# Patient Record
Sex: FEMALE | Race: OTHER | Employment: FULL TIME | URBAN - METROPOLITAN AREA
[De-identification: names, ages, dates, MRNs, and addresses within clinical notes are randomized per-mention and may not be internally consistent; named-entity substitution may affect disease eponyms.]

---

## 2019-11-18 ENCOUNTER — TELEPHONE (OUTPATIENT)
Dept: BARIATRICS/WEIGHT MGMT | Age: 39
End: 2019-11-18

## 2019-12-19 ENCOUNTER — NURSE ONLY (OUTPATIENT)
Dept: PRIMARY CARE CLINIC | Age: 39
End: 2019-12-19
Payer: COMMERCIAL

## 2019-12-19 DIAGNOSIS — Z23 NEED FOR HEPATITIS B VACCINATION: Primary | ICD-10-CM

## 2019-12-19 PROCEDURE — 90746 HEPB VACCINE 3 DOSE ADULT IM: CPT | Performed by: NURSE PRACTITIONER

## 2019-12-19 PROCEDURE — 90471 IMMUNIZATION ADMIN: CPT | Performed by: NURSE PRACTITIONER

## 2020-01-07 ENCOUNTER — TELEPHONE (OUTPATIENT)
Dept: BARIATRICS/WEIGHT MGMT | Age: 40
End: 2020-01-07

## 2020-01-10 ENCOUNTER — OFFICE VISIT (OUTPATIENT)
Dept: PSYCHOLOGY | Age: 40
End: 2020-01-10
Payer: COMMERCIAL

## 2020-01-10 PROCEDURE — 90832 PSYTX W PT 30 MINUTES: CPT | Performed by: PSYCHOLOGIST

## 2020-01-10 ASSESSMENT — PATIENT HEALTH QUESTIONNAIRE - PHQ9
4. FEELING TIRED OR HAVING LITTLE ENERGY: 3
SUM OF ALL RESPONSES TO PHQ QUESTIONS 1-9: 19
9. THOUGHTS THAT YOU WOULD BE BETTER OFF DEAD, OR OF HURTING YOURSELF: 1
1. LITTLE INTEREST OR PLEASURE IN DOING THINGS: 2
SUM OF ALL RESPONSES TO PHQ QUESTIONS 1-9: 19
8. MOVING OR SPEAKING SO SLOWLY THAT OTHER PEOPLE COULD HAVE NOTICED. OR THE OPPOSITE, BEING SO FIGETY OR RESTLESS THAT YOU HAVE BEEN MOVING AROUND A LOT MORE THAN USUAL: 3
SUM OF ALL RESPONSES TO PHQ9 QUESTIONS 1 & 2: 3
6. FEELING BAD ABOUT YOURSELF - OR THAT YOU ARE A FAILURE OR HAVE LET YOURSELF OR YOUR FAMILY DOWN: 2
1. LITTLE INTEREST OR PLEASURE IN DOING THINGS: 2
8. MOVING OR SPEAKING SO SLOWLY THAT OTHER PEOPLE COULD HAVE NOTICED. OR THE OPPOSITE, BEING SO FIGETY OR RESTLESS THAT YOU HAVE BEEN MOVING AROUND A LOT MORE THAN USUAL: 3
2. FEELING DOWN, DEPRESSED OR HOPELESS: 1
2. FEELING DOWN, DEPRESSED OR HOPELESS: 1
9. THOUGHTS THAT YOU WOULD BE BETTER OFF DEAD, OR OF HURTING YOURSELF: 1
5. POOR APPETITE OR OVEREATING: 2
7. TROUBLE CONCENTRATING ON THINGS, SUCH AS READING THE NEWSPAPER OR WATCHING TELEVISION: 3
7. TROUBLE CONCENTRATING ON THINGS, SUCH AS READING THE NEWSPAPER OR WATCHING TELEVISION: 3
SUM OF ALL RESPONSES TO PHQ9 QUESTIONS 1 & 2: 3
3. TROUBLE FALLING OR STAYING ASLEEP: 2
5. POOR APPETITE OR OVEREATING: 2
SUM OF ALL RESPONSES TO PHQ QUESTIONS 1-9: 19
3. TROUBLE FALLING OR STAYING ASLEEP: 2
6. FEELING BAD ABOUT YOURSELF - OR THAT YOU ARE A FAILURE OR HAVE LET YOURSELF OR YOUR FAMILY DOWN: 2
SUM OF ALL RESPONSES TO PHQ QUESTIONS 1-9: 19
4. FEELING TIRED OR HAVING LITTLE ENERGY: 3

## 2020-01-10 ASSESSMENT — ANXIETY QUESTIONNAIRES
6. BECOMING EASILY ANNOYED OR IRRITABLE: 3-NEARLY EVERY DAY
3. WORRYING TOO MUCH ABOUT DIFFERENT THINGS: 3-NEARLY EVERY DAY
4. TROUBLE RELAXING: 2-OVER HALF THE DAYS
GAD7 TOTAL SCORE: 15
2. NOT BEING ABLE TO STOP OR CONTROL WORRYING: 2-OVER HALF THE DAYS
7. FEELING AFRAID AS IF SOMETHING AWFUL MIGHT HAPPEN: 3-NEARLY EVERY DAY
5. BEING SO RESTLESS THAT IT IS HARD TO SIT STILL: 1-SEVERAL DAYS
1. FEELING NERVOUS, ANXIOUS, OR ON EDGE: 1-SEVERAL DAYS

## 2020-01-10 ASSESSMENT — COLUMBIA-SUICIDE SEVERITY RATING SCALE - C-SSRS
2. HAVE YOU ACTUALLY HAD ANY THOUGHTS OF KILLING YOURSELF?: NO
6. HAVE YOU EVER DONE ANYTHING, STARTED TO DO ANYTHING, OR PREPARED TO DO ANYTHING TO END YOUR LIFE?: NO
1. WITHIN THE PAST MONTH, HAVE YOU WISHED YOU WERE DEAD OR WISHED YOU COULD GO TO SLEEP AND NOT WAKE UP?: NO
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
## 2020-01-17 ENCOUNTER — OFFICE VISIT (OUTPATIENT)
Dept: PSYCHOLOGY | Age: 40
End: 2020-01-17
Payer: COMMERCIAL

## 2020-01-17 PROCEDURE — 90832 PSYTX W PT 30 MINUTES: CPT | Performed by: PSYCHOLOGIST

## 2020-01-17 ASSESSMENT — PATIENT HEALTH QUESTIONNAIRE - PHQ9
SUM OF ALL RESPONSES TO PHQ QUESTIONS 1-9: 19
6. FEELING BAD ABOUT YOURSELF - OR THAT YOU ARE A FAILURE OR HAVE LET YOURSELF OR YOUR FAMILY DOWN: 2
2. FEELING DOWN, DEPRESSED OR HOPELESS: 2
5. POOR APPETITE OR OVEREATING: 3
8. MOVING OR SPEAKING SO SLOWLY THAT OTHER PEOPLE COULD HAVE NOTICED. OR THE OPPOSITE, BEING SO FIGETY OR RESTLESS THAT YOU HAVE BEEN MOVING AROUND A LOT MORE THAN USUAL: 2
SUM OF ALL RESPONSES TO PHQ9 QUESTIONS 1 & 2: 4
1. LITTLE INTEREST OR PLEASURE IN DOING THINGS: 2
7. TROUBLE CONCENTRATING ON THINGS, SUCH AS READING THE NEWSPAPER OR WATCHING TELEVISION: 2
SUM OF ALL RESPONSES TO PHQ QUESTIONS 1-9: 19
9. THOUGHTS THAT YOU WOULD BE BETTER OFF DEAD, OR OF HURTING YOURSELF: 0
4. FEELING TIRED OR HAVING LITTLE ENERGY: 3
3. TROUBLE FALLING OR STAYING ASLEEP: 3

## 2020-01-17 ASSESSMENT — ANXIETY QUESTIONNAIRES
1. FEELING NERVOUS, ANXIOUS, OR ON EDGE: 1-SEVERAL DAYS
5. BEING SO RESTLESS THAT IT IS HARD TO SIT STILL: 1-SEVERAL DAYS
GAD7 TOTAL SCORE: 10
3. WORRYING TOO MUCH ABOUT DIFFERENT THINGS: 2-OVER HALF THE DAYS
6. BECOMING EASILY ANNOYED OR IRRITABLE: 3-NEARLY EVERY DAY
4. TROUBLE RELAXING: 1-SEVERAL DAYS
7. FEELING AFRAID AS IF SOMETHING AWFUL MIGHT HAPPEN: 1-SEVERAL DAYS
2. NOT BEING ABLE TO STOP OR CONTROL WORRYING: 1-SEVERAL DAYS

## 2020-01-23 ENCOUNTER — TELEPHONE (OUTPATIENT)
Dept: BARIATRICS/WEIGHT MGMT | Age: 40
End: 2020-01-23

## 2020-01-23 NOTE — TELEPHONE ENCOUNTER
Called as a new pt courtesy call - spoke w patient. Did receive paperwork - told patient to have new pt paperwork completely filled out, insurance card, and id and to arrive at appt time. If they didn't have the paperwork filled out and arrive on time may be rescheduled.  Told to bring possible copay and ff location

## 2020-01-30 ENCOUNTER — OFFICE VISIT (OUTPATIENT)
Dept: BARIATRICS/WEIGHT MGMT | Age: 40
End: 2020-01-30
Payer: COMMERCIAL

## 2020-01-30 VITALS
DIASTOLIC BLOOD PRESSURE: 87 MMHG | BODY MASS INDEX: 51.82 KG/M2 | HEART RATE: 80 BPM | HEIGHT: 62 IN | RESPIRATION RATE: 18 BRPM | WEIGHT: 281.6 LBS | SYSTOLIC BLOOD PRESSURE: 136 MMHG | OXYGEN SATURATION: 99 %

## 2020-01-30 PROBLEM — E66.01 MORBID OBESITY WITH BMI OF 50.0-59.9, ADULT (HCC): Status: ACTIVE | Noted: 2020-01-30

## 2020-01-30 PROBLEM — I10 ESSENTIAL HYPERTENSION: Status: ACTIVE | Noted: 2020-01-30

## 2020-01-30 PROBLEM — K21.9 CHRONIC GERD: Status: ACTIVE | Noted: 2020-01-30

## 2020-01-30 PROBLEM — E11.69 DIABETES MELLITUS TYPE 2 IN OBESE (HCC): Status: ACTIVE | Noted: 2020-01-30

## 2020-01-30 PROBLEM — G47.33 OBSTRUCTIVE SLEEP APNEA: Status: ACTIVE | Noted: 2020-01-30

## 2020-01-30 PROBLEM — E66.9 DIABETES MELLITUS TYPE 2 IN OBESE (HCC): Status: ACTIVE | Noted: 2020-01-30

## 2020-01-30 PROBLEM — Z01.818 PREOPERATIVE CLEARANCE: Status: ACTIVE | Noted: 2020-01-30

## 2020-01-30 PROCEDURE — 99205 OFFICE O/P NEW HI 60 MIN: CPT | Performed by: SURGERY

## 2020-01-30 RX ORDER — SERTRALINE HYDROCHLORIDE 100 MG/1
100 TABLET, FILM COATED ORAL
COMMUNITY
Start: 2019-02-21

## 2020-01-30 RX ORDER — VALACYCLOVIR HYDROCHLORIDE 500 MG/1
500 TABLET, FILM COATED ORAL DAILY
COMMUNITY

## 2020-01-30 RX ORDER — LEVOTHYROXINE SODIUM 175 UG/1
175 TABLET ORAL
COMMUNITY
Start: 2017-05-04

## 2020-01-30 RX ORDER — FEXOFENADINE HCL 180 MG/1
180 TABLET ORAL DAILY
COMMUNITY
Start: 2019-10-15

## 2020-01-30 RX ORDER — PIOGLITAZONEHYDROCHLORIDE 30 MG/1
30 TABLET ORAL DAILY
COMMUNITY
Start: 2019-02-21

## 2020-01-30 RX ORDER — ATORVASTATIN CALCIUM 20 MG/1
20 TABLET, FILM COATED ORAL NIGHTLY
COMMUNITY
Start: 2019-12-05

## 2020-01-30 NOTE — PATIENT INSTRUCTIONS
Labs ordered. Psych Evaluation. Cardiac Clearance. Sleep Study & CPAP Compliance. UltraSound Gallbladder. EGD (Upper Endoscopy). Support Group. PCP Letter. Quit Smoking,  Alcohol, Caffeine and Carbonated Drinks  Weight History 2 yrs. F/U in 4 weeks. Patient advised that its their responsibility to follow up for studies, referrals and/or labs ordered today. Patient received dietary handouts and education.     Goals:   -Eat 4-5 times daily  -Avoid high fat and high sugar foods  -Include protein with all meals and snacks  -Avoid carbonation and caffeine  -Avoid calorie containing beverages  -Increase physical activity as tolerated

## 2020-01-30 NOTE — PROGRESS NOTES
Nissa Albrecht is a 44 y.o. female with a date of birth of 1980. Vitals:    01/30/20 1455   BP: 136/87   Pulse: 80   Resp: 18   SpO2: 99%    BMI: Body mass index is 51.51 kg/m². Obesity Classification: Class III    Weight History: Wt Readings from Last 3 Encounters:   01/30/20 281 lb 9.6 oz (127.7 kg)     Patient's lowest adult weight was 160 lbs at age 19's. Patient's highest adult weight was 281 lbs at age 44 - current. Patient has participated in the following weight loss programs: Apture Diet, Legal Shine diet, and physician supervised diet. Patient has participated in meal replacement/liquid diets. Slimfast  Patient has participated in weight loss medications. Patient is lactose intolerant for milk/cheese - yogurt okay in small amounts. Patient does not have Denominational/cultural food concerns. Patient does have food allergies for eggplant + kiwi. Patient does tolerate artificial sweeteners. Patient dines out to a sit down restaurant 10-15 times per month. Patient dines out to a fast food restaurant 10-15 times per month. 24 hour recall/food frequency chart:  Breakfast: yes. Bagel w/ whipped cream cheese OR 1-2 Donuts + Large chocolate coffee  Snack: no. M&M's/candy/cookies  Lunch: yes. Cup'o'Noodles   Snack: no.  Dinner: yes. [employer] Marzette [employer]oor - 2 sandwich + ice cream/dessert  Snack: yes. Leftovers  Drinks throughout the day: 6 cans regular soda, Coffee w/ powdered creamer/sweetener  Do you drink alcohol? Yes. How often/how much alcohol do you drink: 1 Mixed drinks per month. Patient does meet the criteria for binge eating disorder. Patient does have grazing. Patient does not have night eating. Patient does have a history of emotional eating or eating out of boredom. It does take an unusually large amount of food for the patient to feel comfortably full. Most days the patient eats until she is stuffed + sleepy.       Patient describes level of activity as nothing

## 2020-01-30 NOTE — LETTER
[email or web address] [email or web address]  [facility]  Phone: [phone]  Fax: [phone]    Uyen Rausch MD        February 4, 2020     Hugo Grullon MD  No address on file    Patient: Julietta Krabbe  MR Number: <[id number]>  YOB: 1980  Date of Visit: 1/30/2020    Dear Dr. Hugo Grullon:    Thank you for the request for consultation for Julietta Krabbe to me for the evaluation of obesity. Below are the relevant portions of my assessment and plan of care. Julietta Krabbe is a very pleasant 44 y.o. female with Obesity,  Body mass index is 51.51 kg/m². and multiple obesity related co-morbidities. Julietta Krabbe is very motivated to lose weight and being more healthy. We discussed how her weight affects her overall health including:  Patient Active Problem List   Diagnosis    Preoperative clearance    Diabetes mellitus type 2 in obese ([address]    Morbid obesity with BMI of 50.0-59.9, adult ([facility]    Essential hypertension    Chronic GERD    Obstructive sleep apnea      The patient underwent 30 minutes of dietary counseling. I have reviewed, discussed and agree with the dietary plan. Medical weight loss and different surgical options were discussed in details with patient. Clement Pereyra is interested in surgical weight loss for future weight loss. Patient is interested in Laparoscopic Sleeve Gastrectomy, which I believe is an excellent option. We will proceed with pre-operative work up labs and studies. Will also petition patient's  insurance for approval for this procedure. I advised the patient that we can't guarantee final insurance approval.    Patient received dietary handouts and education. Patient advised that its their responsibility to follow up for studies, referrals and/or labs ordered today.    Also discussed in details the importance of follow up, as well following the recommendations and completing the whole program to improve outcomes

## 2020-01-30 NOTE — PROGRESS NOTES
Smoking status: Never Smoker    Smokeless tobacco: Never Used   Substance Use Topics    Alcohol use: Not Currently     I counseled the patient on the importance of not smoking and risks of ETOH. No Known Allergies  Vitals:    01/30/20 1455   BP: 136/87   Pulse: 80   Resp: 18   SpO2: 99%   Weight: 281 lb 9.6 oz (127.7 kg)   Height: 5' 2\" (1.575 m)       Body mass index is 51.51 kg/m². Current Outpatient Medications:     metFORMIN (GLUCOPHAGE) 1000 MG tablet, , Disp: , Rfl:     pioglitazone (ACTOS) 30 MG tablet, Take 30 mg by mouth, Disp: , Rfl:     sertraline (ZOLOFT) 100 MG tablet, Take 100 mg by mouth, Disp: , Rfl:     levothyroxine (SYNTHROID) 175 MCG tablet, Take 175 mcg by mouth, Disp: , Rfl:     atorvastatin (LIPITOR) 20 MG tablet, , Disp: , Rfl:     valACYclovir (VALTREX) 500 MG tablet, Take 500 mg by mouth, Disp: , Rfl:     fexofenadine (ALLEGRA) 180 MG tablet, Take 180 mg by mouth, Disp: , Rfl:       Review of Systems - History obtained from the patient  General ROS: overweight   Psychological ROS: negative  Ophthalmic ROS: negative  Neurological ROS: negative  ENT ROS: negative  Allergy and Immunology ROS: negative  Hematological and Lymphatic ROS: negative  Endocrine ROS: overweight  Breast ROS: negative  Respiratory ROS: negative  Cardiovascular ROS: negative  Gastrointestinal ROS:negative  Genito-Urinary ROS: negative  Musculoskeletal ROS: weight effects on joints  Skin ROS: negative    Physical Exam   Constitutional: Patient is oriented to person, place, and time. Vital signs are normal. Patient  appears well-developed and well-nourished. Patient  is active and cooperative. Non-toxic appearance. No distress. HENT:   Head: Normocephalic and atraumatic. Head is without laceration. Right Ear: External ear normal. No lacerations. No drainage, swelling or tenderness. Left Ear: External ear normal. No lacerations. No drainage, swelling or tenderness.    Nose: Nose normal. No nose lacerations or nasal deformity. Mouth/Throat: Uvula is midline, oropharynx is clear and moist and mucous membranes are normal. No oropharyngeal exudate. Eyes: Conjunctivae, EOM and lids are normal. Pupils are equal, round, and reactive to light. Right eye exhibits no discharge. No foreign body present in the right eye. Left eye exhibits no discharge. No foreign body present in the left eye. No scleral icterus. Neck: Trachea normal and normal range of motion. Neck supple. No JVD present. No tracheal tenderness present. Carotid bruit is not present. No rigidity. No tracheal deviation and no edema present. No thyromegaly present. Cardiovascular: Normal rate, regular rhythm, normal heart sounds, intact distal pulses and normal pulses. Pulmonary/Chest: Effort normal and breath sounds normal. No stridor. No respiratory distress. Patient  has no wheezes. Patient has no rales. Patient exhibits no tenderness and no crepitus. Abdominal: Soft. Normal appearance and bowel sounds are normal. Patient exhibits no distension, no abdominal bruit, no ascites and no mass. There is no hepatosplenomegaly. There is no tenderness. There is no rigidity, no rebound, no guarding and no CVA tenderness. No hernia. Hernia confirmed negative in the ventral area. Musculoskeletal: Normal range of motion. Patient exhibits no edema or tenderness. Lymphadenopathy:        Head (right side): No submental, no submandibular, no preauricular, no posterior auricular and no occipital adenopathy present. Head (left side): No submental, no submandibular, no preauricular, no posterior auricular and no occipital adenopathy present. Patient  has no cervical adenopathy. Right: No supraclavicular adenopathy present. Left: No supraclavicular adenopathy present. Neurological: Patient is alert and oriented to person, place, and time. Patient has normal strength. Coordination and gait normal. GCS eye subscore is 4.  GCS verbal subscore is 5. GCS motor subscore is 6. Skin: Skin is warm and dry. No abrasion and no rash noted. Patient  is not diaphoretic. No cyanosis or erythema. Psychiatric: Patient has a normal mood and affect. speech is normal and behavior is normal. Cognition and memory are normal.         Thai was seen today for bariatric, initial visit. Diagnoses and all orders for this visit:    Diabetes mellitus type 2 in obese ([facility]  -     Protime-INR; Future  -     US Gallbladder Ruq; Future  -     Ambulatory referral to Cardiology  -     Ambulatory referral to Sleep Medicine  -     CBC Auto Differential; Future  -     Comprehensive Metabolic Panel; Future  -     Hemoglobin A1C; Future  -     Iron and TIBC; Future  -     Lipid Panel; Future  -     TSH with Reflex; Future  -     Vitamin B12 & Folate; Future  -     Vitamin A; Future  -     Vitamin B1, Whole Blood; Future  -     Vitamin D 25 Hydroxy; Future  -     Vitamin E; Future    Preoperative clearance  -     Protime-INR; Future  -     US Gallbladder Ruq; Future  -     Ambulatory referral to Cardiology  -     Ambulatory referral to Sleep Medicine  -     CBC Auto Differential; Future  -     Comprehensive Metabolic Panel; Future  -     Hemoglobin A1C; Future  -     Iron and TIBC; Future  -     Lipid Panel; Future  -     TSH with Reflex; Future  -     Vitamin B12 & Folate; Future  -     Vitamin A; Future  -     Vitamin B1, Whole Blood; Future  -     Vitamin D 25 Hydroxy; Future  -     Vitamin E; Future    Morbid obesity with BMI of 50.0-59.9, adult ([facility]  -     Protime-INR; Future  -     US Gallbladder Ruq; Future  -     Ambulatory referral to Cardiology  -     Ambulatory referral to Sleep Medicine  -     CBC Auto Differential; Future  -     Comprehensive Metabolic Panel; Future  -     Hemoglobin A1C; Future  -     Iron and TIBC; Future  -     Lipid Panel; Future  -     TSH with Reflex; Future  -     Vitamin B12 & Folate;  Future  -     Vitamin A; Future  -     Vitamin B1, Whole Blood; Future  -     Vitamin D 25 Hydroxy; Future  -     Vitamin E; Future    Essential hypertension  -     Protime-INR; Future  -     US Gallbladder Ruq; Future  -     Ambulatory referral to Cardiology  -     Ambulatory referral to Sleep Medicine  -     CBC Auto Differential; Future  -     Comprehensive Metabolic Panel; Future  -     Hemoglobin A1C; Future  -     Iron and TIBC; Future  -     Lipid Panel; Future  -     TSH with Reflex; Future  -     Vitamin B12 & Folate; Future  -     Vitamin A; Future  -     Vitamin B1, Whole Blood; Future  -     Vitamin D 25 Hydroxy; Future  -     Vitamin E; Future    Chronic GERD  -     Protime-INR; Future  -     US Gallbladder Ruq; Future  -     Ambulatory referral to Cardiology  -     Ambulatory referral to Sleep Medicine  -     CBC Auto Differential; Future  -     Comprehensive Metabolic Panel; Future  -     Hemoglobin A1C; Future  -     Iron and TIBC; Future  -     Lipid Panel; Future  -     TSH with Reflex; Future  -     Vitamin B12 & Folate; Future  -     Vitamin A; Future  -     Vitamin B1, Whole Blood; Future  -     Vitamin D 25 Hydroxy; Future  -     Vitamin E; Future    Obstructive sleep apnea  -     Protime-INR; Future  -     US Gallbladder Ruq; Future  -     Ambulatory referral to Cardiology  -     Ambulatory referral to Sleep Medicine  -     CBC Auto Differential; Future  -     Comprehensive Metabolic Panel; Future  -     Hemoglobin A1C; Future  -     Iron and TIBC; Future  -     Lipid Panel; Future  -     TSH with Reflex; Future  -     Vitamin B12 & Folate; Future  -     Vitamin A; Future  -     Vitamin B1, Whole Blood; Future  -     Vitamin D 25 Hydroxy; Future  -     Vitamin E; Future          A/P  Pamela Woodard is a very pleasant 44 y.o. female with Obesity,  Body mass index is 51.51 kg/m². and multiple obesity related co-morbidities. Pamela Woodard is very motivated to lose weight and being more healthy.    We discussed how her weight affects her overall health including:  Patient Active Problem List   Diagnosis    Preoperative clearance    Diabetes mellitus type 2 in obese ([facility]    Morbid obesity with BMI of 50.0-59.9, adult ([facility]    Essential hypertension    Chronic GERD    Obstructive sleep apnea      The patient underwent 30 minutes of dietary counseling. I have reviewed, discussed and agree with the dietary plan. Medical weight loss and different surgical options were discussed in details with patient. Ishan Shrestha is interested in surgical weight loss for future weight loss. Patient is interested in Laparoscopic Sleeve Gastrectomy, which I believe is an excellent option. We will proceed with pre-operative work up labs and studies. Will also petition patient's  insurance for approval for this procedure. I advised the patient that we can't guarantee final insurance approval.    Patient received dietary handouts and education. Patient advised that its their responsibility to follow up for studies, referrals and/or labs ordered today. Also discussed in details the importance of follow up, as well following the recommendations and completing the whole program to improve outcomes when it comes to healthier lifestyle as well weight loss. Patient also advised about risks and benefits being on a strict dietary regimen as well using supplements. Patient agrees and wants to proceed with weight loss planning       Patient Instructions   Labs ordered. Psych Evaluation. Cardiac Clearance. Sleep Study & CPAP Compliance. UltraSound Gallbladder. EGD (Upper Endoscopy). Support Group. PCP Letter. Quit Smoking,  Alcohol, Caffeine and Carbonated Drinks  Weight History 2 yrs. F/U in 4 weeks. Patient advised that its their responsibility to follow up for studies, referrals and/or labs ordered today. Patient received dietary handouts and education.     Goals:   -Eat 4-5 times daily  -Avoid high fat and high sugar foods  -Include protein with all meals and snacks  -Avoid carbonation and caffeine  -Avoid calorie containing beverages  -Increase physical activity as tolerated

## 2020-01-31 DIAGNOSIS — E66.01 MORBID OBESITY WITH BMI OF 50.0-59.9, ADULT (HCC): ICD-10-CM

## 2020-01-31 DIAGNOSIS — K21.9 CHRONIC GERD: ICD-10-CM

## 2020-01-31 DIAGNOSIS — E11.69 DIABETES MELLITUS TYPE 2 IN OBESE (HCC): ICD-10-CM

## 2020-01-31 DIAGNOSIS — E66.9 DIABETES MELLITUS TYPE 2 IN OBESE (HCC): ICD-10-CM

## 2020-01-31 DIAGNOSIS — Z01.818 PREOPERATIVE CLEARANCE: ICD-10-CM

## 2020-01-31 DIAGNOSIS — G47.33 OBSTRUCTIVE SLEEP APNEA: ICD-10-CM

## 2020-01-31 DIAGNOSIS — I10 ESSENTIAL HYPERTENSION: ICD-10-CM

## 2020-01-31 LAB
A/G RATIO: 1.3 (ref 1.1–2.2)
ALBUMIN SERPL-MCNC: 3.9 G/DL (ref 3.4–5)
ALP BLD-CCNC: 90 U/L (ref 40–129)
ALT SERPL-CCNC: 17 U/L (ref 10–40)
ANION GAP SERPL CALCULATED.3IONS-SCNC: 12 MMOL/L (ref 3–16)
AST SERPL-CCNC: 16 U/L (ref 15–37)
BASOPHILS ABSOLUTE: 0 K/UL (ref 0–0.2)
BASOPHILS RELATIVE PERCENT: 0.7 %
BILIRUB SERPL-MCNC: 0.6 MG/DL (ref 0–1)
BUN BLDV-MCNC: 11 MG/DL (ref 7–20)
CALCIUM SERPL-MCNC: 8.9 MG/DL (ref 8.3–10.6)
CHLORIDE BLD-SCNC: 98 MMOL/L (ref 99–110)
CHOLESTEROL, TOTAL: 148 MG/DL (ref 0–199)
CO2: 27 MMOL/L (ref 21–32)
CREAT SERPL-MCNC: <0.5 MG/DL (ref 0.6–1.1)
EOSINOPHILS ABSOLUTE: 0.2 K/UL (ref 0–0.6)
EOSINOPHILS RELATIVE PERCENT: 3.3 %
FOLATE: 11.16 NG/ML (ref 4.78–24.2)
GFR AFRICAN AMERICAN: >60
GFR NON-AFRICAN AMERICAN: >60
GLOBULIN: 2.9 G/DL
GLUCOSE BLD-MCNC: 135 MG/DL (ref 70–99)
HCT VFR BLD CALC: 38.9 % (ref 36–48)
HDLC SERPL-MCNC: 43 MG/DL (ref 40–60)
HEMOGLOBIN: 13.1 G/DL (ref 12–16)
INR BLD: 1.01 (ref 0.86–1.14)
IRON SATURATION: 32 % (ref 15–50)
IRON: 102 UG/DL (ref 37–145)
LDL CHOLESTEROL CALCULATED: 79 MG/DL
LYMPHOCYTES ABSOLUTE: 1.6 K/UL (ref 1–5.1)
LYMPHOCYTES RELATIVE PERCENT: 25.8 %
MCH RBC QN AUTO: 30.5 PG (ref 26–34)
MCHC RBC AUTO-ENTMCNC: 33.7 G/DL (ref 31–36)
MCV RBC AUTO: 90.5 FL (ref 80–100)
MONOCYTES ABSOLUTE: 0.4 K/UL (ref 0–1.3)
MONOCYTES RELATIVE PERCENT: 5.6 %
NEUTROPHILS ABSOLUTE: 4.1 K/UL (ref 1.7–7.7)
NEUTROPHILS RELATIVE PERCENT: 64.6 %
PDW BLD-RTO: 13.7 % (ref 12.4–15.4)
PLATELET # BLD: 305 K/UL (ref 135–450)
PMV BLD AUTO: 8.4 FL (ref 5–10.5)
POTASSIUM SERPL-SCNC: 4 MMOL/L (ref 3.5–5.1)
PROTHROMBIN TIME: 11.7 SEC (ref 10–13.2)
RBC # BLD: 4.3 M/UL (ref 4–5.2)
SODIUM BLD-SCNC: 137 MMOL/L (ref 136–145)
T4 FREE: 0.8 NG/DL (ref 0.9–1.8)
TOTAL IRON BINDING CAPACITY: 319 UG/DL (ref 260–445)
TOTAL PROTEIN: 6.8 G/DL (ref 6.4–8.2)
TRIGL SERPL-MCNC: 130 MG/DL (ref 0–150)
TSH REFLEX: 17.12 UIU/ML (ref 0.27–4.2)
VITAMIN B-12: 727 PG/ML (ref 211–911)
VITAMIN D 25-HYDROXY: 50.2 NG/ML
VLDLC SERPL CALC-MCNC: 26 MG/DL
WBC # BLD: 6.3 K/UL (ref 4–11)

## 2020-02-01 LAB
ESTIMATED AVERAGE GLUCOSE: 162.8 MG/DL
HBA1C MFR BLD: 7.3 %

## 2020-02-04 ENCOUNTER — TELEPHONE (OUTPATIENT)
Dept: BARIATRICS/WEIGHT MGMT | Age: 40
End: 2020-02-04

## 2020-02-04 LAB
ALPHA-TOCOPHEROL: 7.5 MG/L (ref 5.5–18)
GAMMA-TOCOPHEROL: 1.9 MG/L (ref 0–6)
RETINYL PALMITATE: <0.02 MG/L (ref 0–0.1)
VITAMIN A LEVEL: 0.37 MG/L (ref 0.3–1.2)
VITAMIN A, INTERP: NORMAL

## 2020-02-05 LAB — VITAMIN B1 WHOLE BLOOD: 167 NMOL/L (ref 70–180)

## 2020-02-20 PROBLEM — E78.2 MIXED HYPERLIPIDEMIA: Status: ACTIVE | Noted: 2020-02-20

## 2020-02-29 PROBLEM — Z01.818 PREOPERATIVE CLEARANCE: Status: RESOLVED | Noted: 2020-01-30 | Resolved: 2020-02-29

## 2020-03-18 ENCOUNTER — TELEPHONE (OUTPATIENT)
Dept: BARIATRICS/WEIGHT MGMT | Age: 40
End: 2020-03-18

## 2020-03-20 ASSESSMENT — ENCOUNTER SYMPTOMS
EYES NEGATIVE: 1
ALLERGIC/IMMUNOLOGIC NEGATIVE: 1
RESPIRATORY NEGATIVE: 1
GASTROINTESTINAL NEGATIVE: 1

## 2020-03-20 NOTE — PATIENT INSTRUCTIONS
Patient received dietary handouts and education. Goals in preparing for bariatric surgery  You should be giving up all beverages that have carbonation, sugar, and caffeine (Refer to the approved liquids list provided at initial visit).  You should be drinking 64 ounces of low calorie (5 calories or less per serving) fluids per day. Suggestions include:  o Water (you may add fresh lemon or lime)  o Crystal Light  o Eliseo Liquid Water Enhancer  o Propel Zero  o Powerade Zero/Gatorade Zero  o Isopure  o Msyer2P  o SOBE Lifewater Zero  o Vitamin Water Zero  o Sugar Free Charles-Aid  You should be eating 4-6 times per day.  Three small meals plus 1-2 snacks per day is your goal. This balances your calories and nutrients evenly throughout the day and helps to boost your metabolism. Refer to the snack list provided at your initial visit. Aim for a protein at every snack, plus a fruit, vegetable or starch. You should be eating protein at every meal and snack.  Protein is typically found in animal sources, i.e. chicken, lean beef, lean pork, fish, seafood and eggs. It is also found in low-fat dairy sources such as skim or 1% milk, low-fat yogurt, low-fat cheese, and low-fat cottage cheese. Plant based sources of protein include peanut butter, beans, and soy. You should be utilizing the 9-inch plate method.  Eating on a smaller plate will help you control portion size, but what you put on your plate counts also. Make ¼ of your plate lean protein, ¼ carbohydrate (fruit, grain or starchy vegetables) and ½ the plate non-starchy vegetables. You should eliminate caffeine.  Caffeine is dehydrating. After surgery, it's very important to stay hydrated. Giving up caffeine before surgery will help you focus on the changes necessary to be successful after surgery. There are many decaffeinated coffee and tea products available in grocery stores. You should be reducing added fat and sugar in your diet.    Frying foods adds

## 2020-03-24 ENCOUNTER — TELEPHONE (OUTPATIENT)
Dept: BARIATRICS/WEIGHT MGMT | Age: 40
End: 2020-03-24

## 2020-03-30 ENCOUNTER — TELEMEDICINE (OUTPATIENT)
Dept: BARIATRICS/WEIGHT MGMT | Age: 40
End: 2020-03-30
Payer: COMMERCIAL

## 2020-03-30 VITALS — WEIGHT: 285 LBS | BODY MASS INDEX: 52.44 KG/M2 | HEIGHT: 62 IN

## 2020-03-30 PROCEDURE — 99442 PR PHYS/QHP TELEPHONE EVALUATION 11-20 MIN: CPT | Performed by: NURSE PRACTITIONER

## 2020-03-30 ASSESSMENT — ENCOUNTER SYMPTOMS
COUGH: 0
SHORTNESS OF BREATH: 0

## 2020-03-30 NOTE — PROGRESS NOTES
Toney Hunt gained 3.1 lbs over past 2 months. Due to the COVID-19 restrictions on close contact interactions the patient's visit was conducted via telephone in man of a face to face visit. The patient is here through telemedicine for their 2nd pre surg visit. Breakfast: granola bar     Snack: granola bar     Lunch: Salad with chicken     Snack: nothing    Dinner: chicken/beef and 1 cup of rice     Snack: grapes OR regular/SF jello     Fluids: caffeinated coffee with powdered creamer and splenda, soda, water, gatorade zero    Is pt consuming smaller portions? Needs to decrease CHO     Is pt consuming at least 64 oz of fluids per day? No    Is pt consuming carbonated, caffeinated, or sugary beverages? Soda, coffee    Has pt sampled Unjury and/or Nectar protein?  Not yet, discussed today     Exercise: Walking     Plan/Recommendations:   Switch granola bar to protein bar - focus on protein for meals/snacks   Switch to decaf coffee/eliminate soda   Decrease CHO portion   Continue walking     Handouts: none    Sean Duke

## 2020-04-16 ASSESSMENT — ENCOUNTER SYMPTOMS
ALLERGIC/IMMUNOLOGIC NEGATIVE: 1
GASTROINTESTINAL NEGATIVE: 1
RESPIRATORY NEGATIVE: 1
EYES NEGATIVE: 1

## 2020-04-16 NOTE — PATIENT INSTRUCTIONS
fryer as it requires significantly less oil. Baking, broiling, or grilling meats add flavor without unhealthy fats. Using cooking oil spray and spray butter products are also healthy options that will aid in your weight loss. Foods high in added sugars are often also high in calories and low in nutrients. Eating habits after surgery need to be a long-term change. Eating habits are often so ingrained that it can be difficult to change. It is important to practice new eating habits prior to surgery to mentally prepare yourself for the challenge ahead. Also, remember that overall health, age, and genetics make each person's weight loss progress different. Do not compare your progress (pre- or post-operatively), the amount you eat, or your exercise to other patients. In addition, it is the responsibility of the patient to schedule and follow up on labs and tests completed during the pre-surgical period. Results will be reviewed at each visit. **IT IS IMPORTANT TO KNOW THAT YOU MUST COMPLETE ALL REQUIRED DIETARY CHANGES, TESTS, CLEARANCES AND ACTIVITIES BEFORE A SURGERY DATE CAN BE GIVEN. IF YOU HAVE NOT MET ANY OF THESE REQUIREMENTS THEN YOU WILL NOT BE GIVEN A SURGERY DATE UNTIL THEY HAVE BEEN MET TO OUR SATISFACTION.  THIS IS NOT ONLY DONE TO HELP YOU BE SUCCESSFUL AFTER SURGERY, BUT TO BE SAFE AS WELL.**

## 2020-04-16 NOTE — PROGRESS NOTES
[facility]) Physicians   [employer] [facility] [employer]    4/27/2020    TELEHEALTH EVALUATION -- Audio/Visual (During FLMTR-16 public health emergency)    Subjective:      Patient ID: Maria Kendall is a 36 y.o. female has requested an audio/video evaluation. HPI    Due to the COVID-19 restrictions on close contact interactions the patient's monthly presurgical visit was conducted via audio/video in man of a face to face visit. Patient has consented to have this visit conducted via audio/video and I am conducting it from the office. The patient is here through telemedicine for their bariatric surgery presurgical visit for future weight loss. She has made several attempts at weight loss in the past without success and now wishes to pursue bariatric surgery. She is working to change her dietary behaviors and lose weight to improve comorbid conditions such as hypertension, diabetes mellitus, hyperlipidemia, obstructive sleep apnea and GERD. Maria Kendall is a 36 y.o. female with Body mass index is 52.13 kg/m². Past Medical History:   Diagnosis Date    Chronic GERD 1/30/2020    Diabetes mellitus type 2 in obese [facility]) 1/30/2020    Essential hypertension 1/30/2020    Morbid obesity with BMI of 50.0-59.9, adult ([facility] 1/30/2020    Obstructive sleep apnea 1/30/2020     No past surgical history on file. Family History   Problem Relation Age of Onset    Hypertension Mother     Stroke Mother     Elevated Lipids Mother     Mental Illness Mother     Hypertension Father     Stroke Father     Elevated Lipids Father     Diabetes Father     Mental Illness Father      Social History     Tobacco Use    Smoking status: Never Smoker    Smokeless tobacco: Never Used   Substance Use Topics    Alcohol use: Not Currently     I counseled the patient on the importance of not smoking and risks of ETOH.    No Known Allergies  Vitals:    04/27/20 1551   Weight: 285 lb (129.3 kg)   Height: 5' 2\" (1.575 m)     Body

## 2020-04-27 ENCOUNTER — TELEMEDICINE (OUTPATIENT)
Dept: BARIATRICS/WEIGHT MGMT | Age: 40
End: 2020-04-27
Payer: COMMERCIAL

## 2020-04-27 VITALS — BODY MASS INDEX: 52.44 KG/M2 | WEIGHT: 285 LBS | HEIGHT: 62 IN

## 2020-04-27 PROCEDURE — 3051F HG A1C>EQUAL 7.0%<8.0%: CPT | Performed by: NURSE PRACTITIONER

## 2020-04-27 PROCEDURE — 99213 OFFICE O/P EST LOW 20 MIN: CPT | Performed by: NURSE PRACTITIONER

## 2020-04-27 ASSESSMENT — ENCOUNTER SYMPTOMS
SHORTNESS OF BREATH: 0
COUGH: 0

## 2020-04-27 NOTE — PROGRESS NOTES
Radha Dodge wt is unchanged, per pt report.     Due to the COVID-19 restrictions on close contact interactions the patient's visit was conducted via telephone in man of a face to face visit. The patient is here through telemedicine for their 3nd pre surg visit. *Attempted to call pt x2 to complete the nutrition portion of their visit. Pt did not answer, VM left. Support group information was emailed to [email or web address] per provider request.

## 2020-04-29 ENCOUNTER — TELEPHONE (OUTPATIENT)
Dept: BARIATRICS/WEIGHT MGMT | Age: 40
End: 2020-04-29

## 2020-05-12 ENCOUNTER — TELEPHONE (OUTPATIENT)
Dept: BARIATRICS/WEIGHT MGMT | Age: 40
End: 2020-05-12

## 2020-09-08 ENCOUNTER — HOSPITAL ENCOUNTER (OUTPATIENT)
Dept: MAMMOGRAPHY | Age: 40
Discharge: HOME | End: 2020-09-12
Payer: COMMERCIAL

## 2020-09-08 PROCEDURE — 77063 BREAST TOMOSYNTHESIS BI: CPT

## 2021-02-24 LAB
HPV COMMENT: NORMAL
HPV TYPE 16: NOT DETECTED
HPV TYPE 18: NOT DETECTED
HPVOH (OTHER TYPES): NOT DETECTED

## 2021-09-10 ENCOUNTER — HOSPITAL ENCOUNTER (OUTPATIENT)
Dept: MAMMOGRAPHY | Age: 41
Discharge: HOME | End: 2021-09-14
Payer: COMMERCIAL

## 2021-09-10 DIAGNOSIS — Z12.31 VISIT FOR SCREENING MAMMOGRAM: ICD-10-CM

## 2021-10-20 ENCOUNTER — HOSPITAL ENCOUNTER (OUTPATIENT)
Dept: MAMMOGRAPHY | Age: 41
Discharge: HOME | End: 2021-10-24
Payer: COMMERCIAL

## 2021-10-20 VITALS — HEIGHT: 62 IN | BODY MASS INDEX: 47.84 KG/M2 | WEIGHT: 260 LBS

## 2021-10-20 DIAGNOSIS — Z12.31 VISIT FOR SCREENING MAMMOGRAM: ICD-10-CM

## 2021-10-20 PROCEDURE — 77063 BREAST TOMOSYNTHESIS BI: CPT

## 2021-11-08 ENCOUNTER — TELEPHONE (OUTPATIENT)
Dept: BARIATRICS/WEIGHT MGMT | Age: 41
End: 2021-11-08

## 2021-12-02 ENCOUNTER — OFFICE VISIT (OUTPATIENT)
Dept: BARIATRICS/WEIGHT MGMT | Age: 41
End: 2021-12-02
Payer: COMMERCIAL

## 2021-12-02 VITALS
OXYGEN SATURATION: 98 % | DIASTOLIC BLOOD PRESSURE: 79 MMHG | SYSTOLIC BLOOD PRESSURE: 121 MMHG | HEIGHT: 62 IN | RESPIRATION RATE: 18 BRPM | WEIGHT: 284 LBS | BODY MASS INDEX: 52.26 KG/M2 | HEART RATE: 67 BPM

## 2021-12-02 DIAGNOSIS — E66.01 MORBID OBESITY WITH BMI OF 50.0-59.9, ADULT (HCC): Primary | ICD-10-CM

## 2021-12-02 DIAGNOSIS — G47.33 OBSTRUCTIVE SLEEP APNEA: ICD-10-CM

## 2021-12-02 DIAGNOSIS — I10 ESSENTIAL HYPERTENSION: ICD-10-CM

## 2021-12-02 DIAGNOSIS — E78.2 MIXED HYPERLIPIDEMIA: ICD-10-CM

## 2021-12-02 DIAGNOSIS — K21.9 CHRONIC GERD: ICD-10-CM

## 2021-12-02 PROCEDURE — 99215 OFFICE O/P EST HI 40 MIN: CPT | Performed by: SURGERY

## 2021-12-02 RX ORDER — GLIPIZIDE 5 MG/1
5 TABLET ORAL
COMMUNITY

## 2021-12-02 RX ORDER — DEXTROAMPHETAMINE SACCHARATE, AMPHETAMINE ASPARTATE MONOHYDRATE, DEXTROAMPHETAMINE SULFATE AND AMPHETAMINE SULFATE 2.5; 2.5; 2.5; 2.5 MG/1; MG/1; MG/1; MG/1
10 CAPSULE, EXTENDED RELEASE ORAL EVERY MORNING
COMMUNITY

## 2021-12-02 NOTE — PROGRESS NOTES
Terence Painting is a 39 y.o. female with a date of birth of 1980. Vitals:    12/02/21 1022   BP: 121/79   Pulse: 67   Resp: 18   SpO2: 98%    BMI: Body mass index is 51.94 kg/m². Obesity Classification: Class III    Weight History: Wt Readings from Last 3 Encounters:   12/02/21 284 lb (128.8 kg)   10/20/21 260 lb (117.9 kg)   04/27/20 285 lb (129.3 kg)       Patient's lowest adult weight was 160 lbs at age 25. Patient's highest adult weight was 295 lbs at age recently. Patient has participated in the following weight loss programs: Cabbage Soup Diet, zone diet    Patient has participated in meal replacement/liquid diets. Patient has participated in weight loss medications. Patient is  lactose intolerant. Patient does not have Gnosticist/cultural food concerns. Patient does have food allergies- eggplant. Patient does tolerate artificial sweeteners. 24 hour recall/food frequency chart:    Breakfast: yes. 2-3 days per week only. 2 slices toast with butter, OR 2 eggs scrambled or HB  Snack: no.   Lunch: yes. \"Whatever I can find\" - hungryman frozen dinner or leftovers. Ham & cheese sandwich with regular conklin  Snack: no. Rare   Dinner: yes. Cooks, makes homemade corn tortillas, white rice, beans, stewed chix, stewed potatoes. Typical meal 2 corn + 1 cup rice + chix + potatoes. No veggies  Dines out 2-3 nights per week    Snack: yes. Trail mix fruit/nuts    Drinks throughout the day: seltzer water - 3 cans, pepsi - 3 cans, 1 cup coffee with coffeemate powder creamer and splenda    Do you drink alcohol? Yes. Few times per year - after 1 gets sleepy    Patient does meet the criteria for binge eating disorder. Patient does not have grazing. Patient does not have night eating. Patient does have a history of emotional eating or eating out of boredom.     Pt describes sleep routine as: she snores a lot- she thinks she may have sleep apnea, doesn't have a regular sleep schedule    Surgery  Patient does feel confident in her ability to make these changes. The patient's expectations of post-surgical eating habits realistic. Patient states she does understand the consequences of not complying with post-op food guidelines. Patient states she does understands the long term changes in food intake that will be necessary for all occasions after surgery for the rest of her life. Patient is deemed nutritionally appropriate to proceed. Goals  Weight: 160  Health Improvement: DM2, CHF, arthritis    Assessment  Nutritional Needs: RMR=(9.99 x 128.8kg) + (6.25 x 157.5cm) - (4.92 x 41 y.o.) -161= 1910 kcal x 1.3 (sedentary activity factor)= 2483 kcal - 1000 (for 2 lb weight loss/week)= 1483 kcal.    Plan  Plan/Recommendations: Start presurgical guidelines. Goals:   -Eat 4-5 times daily  -Avoid high fat and high sugar foods  -Include protein with all meals and snacks  -Avoid beverages with carbonation, caffeine, and added sugars   -Increase physical activity as tolerated    PES Statement:  Overweight/Obesity related to lack of exercise, sedentary lifestyle, unhealthy eating habits, and unsuccessful diet attempts as evidenced by BMI. Body mass index is 47.55 kg/m². Will follow up as necessary.     Butch Nj, CHAY, LD

## 2021-12-02 NOTE — PATIENT INSTRUCTIONS
Patient received dietary handouts and education.        -Plan for Laparoscopic sleeve gastrectomy      Pre-operative work up Ordered:    - Nutrition Labs. - Psych Evaluation.   - Cardiac Clearance. - Sleep Study & CPAP Compliance. - EGD (endoscopy to check your stomach). - Support Group Attendance. - Obtain letter of medical necessity (PCP Letter). - Quit Smoking,  Alcohol, Caffeine and Carbonated Drinks  - Obtain records for Weight History 2 yrs. - Start Regular Exercise and track your activities. - Start Tracking your food Intake and follow dietary guidelines. - Avoid Pregnancy for 2 yrs from date of surgery. (for female patients in childbearing age)  - F/U in 4 weeks. Patient advised that its their responsibility to follow up for studies, referrals and/or labs ordered today.

## 2021-12-03 NOTE — PROGRESS NOTES
[facility]) Physicians   [facility]  Deandre Bello MD, [address] [facility], [address] [place] [address]    [facility] [id number] . Phone: [phone]  Fax: [phone]       Chief Complaint   Patient presents with    Obesity     NP [facility] restart           HPI:    Maritza Paz is a very pleasant 39 y.o. obese female ,   Body mass index is 51.94 kg/m². And multiple medical problems who is presenting for weight loss surgery evaluation and consultation by Dr. Ana Dangelo. Patient has been struggling for several years now with obesity. Patient feels the weight is an obstacle to achieve and perform things in daily living as well risk on health. Tries to diet, and exercise but can't keep the weight off. Patient tried [employer] Diet, zone diet     Patient has participated in meal replacement/liquid diets. Patient has participated in weight loss medications and other regimens, but with no sustainable weight loss. Patient  is very determined to lose weight and be healthy, and is interested in surgical weight loss for future weight loss. .    Otherwise patient denies any nausea, vomiting, fevers, chills, shortness of breath, chest pain, constipation or urinary symptoms. Obesity related problems Sofia Patel is dealing with:  Patient Active Problem List   Diagnosis    Preoperative clearance    Diabetes mellitus type 2 in obese ([address]    Morbid obesity with BMI of 50.0-59.9, adult ([address]    Essential hypertension    Chronic GERD    Obstructive sleep apnea    Mixed hyperlipidemia           Pain Assessment   Denies any abdominal pain     Past Medical History:   Diagnosis Date    Abdominal hernia     Anxiety     Arthritis     Chronic GERD 1/30/2020    Depression     Diabetes mellitus type 2 in obese ([address] 1/30/2020    Essential hypertension 1/30/2020    Morbid obesity with BMI of 50.0-59.9, adult ([address] 1/30/2020    Obstructive sleep apnea 1/30/2020     History reviewed.  No pertinent surgical history. Family History   Problem Relation Age of Onset    Hypertension Mother     Stroke Mother     Elevated Lipids Mother     Mental Illness Mother     Hypertension Father     Stroke Father     Elevated Lipids Father     Diabetes Father     Mental Illness Father      Social History     Tobacco Use    Smoking status: Never Smoker    Smokeless tobacco: Never Used   Substance Use Topics    Alcohol use: Not Currently         I counseled the patient on the risks of Smoking, ETOH or Drug use, and importance of completely avoiding them, otherwise patient risks surgery cancellation or post operative serious complications if they start using any. Shauna Shoulder acknowledged, agreed not to use and wants to proceed. No Known Allergies  Vitals:    12/02/21 1022   BP: 121/79   Pulse: 67   Resp: 18   SpO2: 98%   Weight: 284 lb (128.8 kg)   Height: 5' 2\" (1.575 m)       Body mass index is 51.94 kg/m².       Current Outpatient Medications:     amphetamine-dextroamphetamine (ADDERALL XR) 10 MG extended release capsule, Take 10 mg by mouth every morning., Disp: , Rfl:     glipiZIDE (GLUCOTROL) 5 MG tablet, Take 5 mg by mouth 2 times daily (before meals), Disp: , Rfl:     metFORMIN (GLUCOPHAGE) 1000 MG tablet, , Disp: , Rfl:     pioglitazone (ACTOS) 30 MG tablet, Take 30 mg by mouth, Disp: , Rfl:     sertraline (ZOLOFT) 100 MG tablet, Take 100 mg by mouth, Disp: , Rfl:     levothyroxine (SYNTHROID) 175 MCG tablet, Take 175 mcg by mouth, Disp: , Rfl:     atorvastatin (LIPITOR) 20 MG tablet, , Disp: , Rfl:     valACYclovir (VALTREX) 500 MG tablet, Take 500 mg by mouth, Disp: , Rfl:     fexofenadine (ALLEGRA) 180 MG tablet, Take 180 mg by mouth, Disp: , Rfl:       Review of Systems - History obtained from the patient  General ROS: overweight   Psychological ROS: negative  Ophthalmic ROS: negative  Neurological ROS: negative  ENT ROS: negative  Allergy and Immunology ROS: negative  Hematological and Lymphatic ROS: negative  Endocrine ROS: overweight  Breast ROS: negative  Respiratory ROS: negative  Cardiovascular ROS: negative  Gastrointestinal ROS:negative  Genito-Urinary ROS: negative  Musculoskeletal ROS: weight effects on joints  Skin ROS: negative    Physical Exam   Constitutional: Patient is oriented to person, place, and time. Vital signs are normal. Patient  appears well-developed and well-nourished. Patient  is active and cooperative. Non-toxic appearance. No distress. HENT:   Head: Normocephalic and atraumatic. Head is without laceration. Right Ear: External ear normal. No lacerations. No drainage, swelling or tenderness. Left Ear: External ear normal. No lacerations. No drainage, swelling or tenderness. Nose/Mouth/Throat: Patient is wearing mask due to Covid-19 pandemic precautions, following CDC and health authorities guidelines. Eyes: Conjunctivae, EOM and lids are normal. Pupils are equal, round, and reactive to light. Right eye exhibits no discharge. No foreign body present in the right eye. Left eye exhibits no discharge. No foreign body present in the left eye. No scleral icterus. Neck: Trachea normal and normal range of motion. Neck supple. No JVD present. No tracheal tenderness present. Carotid bruit is not present. No rigidity. No tracheal deviation and no edema present. No thyromegaly present. Cardiovascular: Normal rate, regular rhythm, normal heart sounds, intact distal pulses and normal pulses. Pulmonary/Chest: Effort normal and breath sounds normal. No stridor. No respiratory distress. Patient  has no wheezes. Patient has no rales. Patient exhibits no tenderness and no crepitus. Abdominal: Soft. Normal appearance and bowel sounds are normal. Patient exhibits no distension, no abdominal bruit, no ascites and no mass. There is no hepatosplenomegaly. There is no tenderness. There is no rigidity, no rebound, no guarding and no CVA tenderness. No hernia. Hernia confirmed negative in the ventral area. Musculoskeletal: Normal range of motion. Patient exhibits no edema or tenderness. Lymphadenopathy:        Head (right side): No submental, no submandibular, no preauricular, no posterior auricular and no occipital adenopathy present. Head (left side): No submental, no submandibular, no preauricular, no posterior auricular and no occipital adenopathy present. Patient  has no cervical adenopathy. Right: No supraclavicular adenopathy present. Left: No supraclavicular adenopathy present. Neurological: Patient is alert and oriented to person, place, and time. Patient has normal strength. Coordination and gait normal. GCS eye subscore is 4. GCS verbal subscore is 5. GCS motor subscore is 6. Skin: Skin is warm and dry. No abrasion and no rash noted. Patient  is not diaphoretic. No cyanosis or erythema. Psychiatric: Patient has a normal mood and affect. speech is normal and behavior is normal. Cognition and memory are normal.         Dilip Bean was seen today for obesity. Diagnoses and all orders for this visit:    Morbid obesity with BMI of 50.0-59.9, adult ([facility]  -     CBC Auto Differential; Future  -     Comprehensive Metabolic Panel; Future  -     Hemoglobin A1C; Future  -     Iron and TIBC; Future  -     Lipid Panel; Future  -     TSH with Reflex; Future  -     Vitamin A; Future  -     Vitamin B1, Whole Blood; Future  -     Vitamin B12 & Folate; Future  -     Vitamin D 25 Hydroxy; Future  -     Vitamin E; Future  -     Protime-INR; Future  -     Ambulatory referral to Cardiology  -     Ambulatory referral to Sleep Medicine    Essential hypertension  -     CBC Auto Differential; Future  -     Comprehensive Metabolic Panel; Future  -     Hemoglobin A1C; Future  -     Iron and TIBC; Future  -     Lipid Panel; Future  -     TSH with Reflex; Future  -     Vitamin A; Future  -     Vitamin B1, Whole Blood;  Future  -     Vitamin B12 & Folate; Future  -     Vitamin D 25 Hydroxy; Future  -     Vitamin E; Future  -     Protime-INR; Future  -     Ambulatory referral to Cardiology  -     Ambulatory referral to Sleep Medicine    Chronic GERD  -     CBC Auto Differential; Future  -     Comprehensive Metabolic Panel; Future  -     Hemoglobin A1C; Future  -     Iron and TIBC; Future  -     Lipid Panel; Future  -     TSH with Reflex; Future  -     Vitamin A; Future  -     Vitamin B1, Whole Blood; Future  -     Vitamin B12 & Folate; Future  -     Vitamin D 25 Hydroxy; Future  -     Vitamin E; Future  -     Protime-INR; Future  -     Ambulatory referral to Cardiology  -     Ambulatory referral to Sleep Medicine    Obstructive sleep apnea  -     CBC Auto Differential; Future  -     Comprehensive Metabolic Panel; Future  -     Hemoglobin A1C; Future  -     Iron and TIBC; Future  -     Lipid Panel; Future  -     TSH with Reflex; Future  -     Vitamin A; Future  -     Vitamin B1, Whole Blood; Future  -     Vitamin B12 & Folate; Future  -     Vitamin D 25 Hydroxy; Future  -     Vitamin E; Future  -     Protime-INR; Future  -     Ambulatory referral to Cardiology  -     Ambulatory referral to Sleep Medicine    Mixed hyperlipidemia  -     CBC Auto Differential; Future  -     Comprehensive Metabolic Panel; Future  -     Hemoglobin A1C; Future  -     Iron and TIBC; Future  -     Lipid Panel; Future  -     TSH with Reflex; Future  -     Vitamin A; Future  -     Vitamin B1, Whole Blood; Future  -     Vitamin B12 & Folate; Future  -     Vitamin D 25 Hydroxy; Future  -     Vitamin E; Future  -     Protime-INR; Future  -     Ambulatory referral to Cardiology  -     Ambulatory referral to Sleep Medicine          A/P  Esthela Norris is a very pleasant 39 y.o. female with Obesity,  Body mass index is 51.94 kg/m². and multiple obesity related co-morbidities. Esthela Norris is very motivated to lose weight and being more healthy.      We discussed how her weight affects her overall health including:  Patient Active Problem List   Diagnosis    Preoperative clearance    Diabetes mellitus type 2 in obese ([facility]    Morbid obesity with BMI of 50.0-59.9, adult ([facility]    Essential hypertension    Chronic GERD    Obstructive sleep apnea    Mixed hyperlipidemia          The patient underwent extensive dietary counseling with the registered dietitian. I have reviewed, discussed and agree with the dietary plan. Medical weight loss and different surgical options were discussed in details with patient. Jono Garcia is interested in surgical weight loss for future weight loss. Patient is interested in Laparoscopic Sleeve Gastrectomy, which I believe is an excellent option. I explained to the patient that surgery does carry a risk specially with the coexisting comorbid conditions the patient have. Surgery as well in obese patiens can carry more risk. Risks including but not limited to; Infection, bleeding, gastric leak or obstruction, persistent nausea, vomiting, or reflux, injury to surrounding structures, risks of anesthesia, stricture, delayed gastric emptying, staple line leak, incisional hernia, malnutrition , hair loss, and/ or Conversion to Open surgery may be necessary. Failure to lose or maintain weight loss, Gallstones or Kidney Stones, Deep Venous Thrombosis , pulmonary embolism and / or death. However I do believe the benefits outweighs that risk. Shauna Shoulder understands the risks and wants to proceed. We will proceed with pre-operative work up labs and studies. Will also petition patient's  insurance for approval for this procedure. I advised the patient that we can't guarantee final insurance approval.    Patient received dietary handouts and education. Patient advised that its their responsibility to follow up for studies, referrals and/or labs ordered today.    Also discussed in details the importance of follow up, as well following the recommendations and completing the whole program to improve outcomes when it comes to healthier lifestyle as well weight loss. Patient also advised about risks and benefits being on a strict dietary regimen as well using supplements. Patient agrees and wants to proceed with weight loss planning     Today's encounter included any number of the following: Bariatric Pre/Post operative work up/protocols, review of labs, imaging, provider notes, outside hospital records, performing examination/evaluation, counseling patient and/or family, ordering medications/tests, placing referrals and communication with referring physicians, coordination of care; discussing dietary plan/recall with the patient as well with registered dietitian and documentation in the EHR. Of note, the above was done during same day of the actual patient encounter. Obesity as a disease is considered a high risk to patients overall health and should therefore be considered a high risk disease state. Advised the patient that not getting there weight under control (weight loss hopefully will help with resolving/improving of the comorbid conditions),  that could increase risk of complications/worsening of those conditions on the long-term. Now with Covid-19 pandemic, CDC and health authorities does classify obese patients as vulnerable and high risk as well. Which makes weight loss a priority for improvement of their wellbeing and overall health. CDC has issued the following statement as far Obese patients being at Increased Risk of being critically ill from SARS-Cov-2  \"Severe obesity increases the risk of a serious breathing problem called acute respiratory distress syndrome (ARDS), which is a major complication of EJDCQ-24 and can cause difficulties with a doctors ability to provide respiratory support for seriously ill patients.  People living with severe obesity can have multiple serious chronic diseases and underlying health conditions that can increase the risk of severe illness from COVID-19. \"       Patient Instructions   Patient received dietary handouts and education.        -Plan for Laparoscopic sleeve gastrectomy      Pre-operative work up Ordered:    - Nutrition Labs. - Psych Evaluation.   - Cardiac Clearance. - Sleep Study & CPAP Compliance. - EGD (endoscopy to check your stomach). - Support Group Attendance. - Obtain letter of medical necessity (PCP Letter). - Quit Smoking,  Alcohol, Caffeine and Carbonated Drinks  - Obtain records for Weight History 2 yrs. - Start Regular Exercise and track your activities. - Start Tracking your food Intake and follow dietary guidelines. - Avoid Pregnancy for 2 yrs from date of surgery. (for female patients in childbearing age)  - F/U in 4 weeks. Patient advised that its their responsibility to follow up for studies, referrals and/or labs ordered today. Please note that some or all of this report was generated using voice recognition software. Please notify me in case of any questions about the content of this document, as some errors in transcription may have occurred .

## 2021-12-10 LAB
ALBUMIN SERPL-MCNC: 3.9 G/DL (ref 3.5–5.7)
ALP BLD-CCNC: 67 IU/L (ref 35–135)
ALT SERPL-CCNC: 18 IU/L (ref 10–60)
ANION GAP SERPL CALCULATED.3IONS-SCNC: 4 MMOL/L (ref 6–18)
AST SERPL-CCNC: 18 IU/L (ref 10–40)
BASOPHILS ABSOLUTE: 0 %
BASOPHILS ABSOLUTE: 0 THOU/MCL (ref 0–0.2)
BILIRUB SERPL-MCNC: 0.5 MG/DL (ref 0–1.2)
BUN BLDV-MCNC: 16 MG/DL (ref 8–26)
CALCIUM SERPL-MCNC: 9 MG/DL (ref 8.5–10.4)
CHLORIDE BLD-SCNC: 103 MEQ/L (ref 98–111)
CHOLESTEROL, TOTAL: 110 MG/DL
CO2: 30 MMOL/L (ref 21–31)
CREAT SERPL-MCNC: 0.55 MG/DL (ref 0.6–1.2)
EGFR (CKD-EPI): 117 ML/MIN/1.73 M2
EOSINOPHILS ABSOLUTE: 0.2 THOU/MCL (ref 0.03–0.45)
EOSINOPHILS RELATIVE PERCENT: 3 %
ESTIMATED AVERAGE GLUCOSE: 194 MG/DL
GLUCOSE BLD-MCNC: 94 MG/DL (ref 70–99)
HBA1C MFR BLD: 8.4 % (ref 4.2–5.6)
HCT VFR BLD CALC: 38 % (ref 36–46)
HDLC SERPL-MCNC: 41 MG/DL
HEMOGLOBIN: 12.6 G/DL (ref 12–15.2)
INR BLD: 1 (ref 0.8–1.2)
IRON SATURATION: 21 % (ref 20–50)
IRON: 70 MCG/DL (ref 30–160)
LDL CHOLESTEROL CALCULATED: 50 MG/DL
LYMPHOCYTES ABSOLUTE: 2.1 THOU/MCL (ref 1–4)
LYMPHOCYTES RELATIVE PERCENT: 25 %
MCH RBC QN AUTO: 29.7 PG (ref 27–33)
MCHC RBC AUTO-ENTMCNC: 33.1 G/DL (ref 32–36)
MCV RBC AUTO: 90 FL (ref 82–97)
MONOCYTES # BLD: 6 %
MONOCYTES ABSOLUTE: 0.5 THOU/MCL (ref 0.2–0.9)
NEUTROPHILS ABSOLUTE: 5.4 THOU/MCL (ref 1.8–7.7)
NONHDLC SERPL-MCNC: 69 MG/DL
PDW BLD-RTO: 14.1 % (ref 12.3–17)
PLATELET # BLD: 295 THOU/MCL (ref 140–375)
PMV BLD AUTO: 8.5 FL (ref 7.4–11.5)
POTASSIUM SERPL-SCNC: 3.9 MEQ/L (ref 3.6–5.1)
PROTHROMBIN TIME: 13.2 SECONDS (ref 11.7–14.2)
RBC # BLD: 4.23 MIL/MCL (ref 3.8–5.2)
SEG NEUTROPHILS: 66 %
SODIUM BLD-SCNC: 137 MEQ/L (ref 135–145)
T4 FREE: 0.91 NG/DL (ref 0.8–1.8)
TOTAL IRON BINDING CAPACITY: 332 MCG/DL (ref 250–400)
TOTAL PROTEIN: 7.1 G/DL (ref 6–8)
TRIGL SERPL-MCNC: 96 MG/DL
TSH ULTRASENSITIVE: 11.3 MCIU/ML (ref 0.27–4.2)
VITAMIN B-12: 516 PG/ML
VITAMIN D 25-HYDROXY: 43.1 NG/ML (ref 30–150)
WBC # BLD: 8.2 THOU/MCL (ref 3.6–10.5)

## 2021-12-15 ENCOUNTER — TELEPHONE (OUTPATIENT)
Dept: BARIATRICS/WEIGHT MGMT | Age: 41
End: 2021-12-15

## 2021-12-16 LAB
ALPHA-TOCOPHEROL: 6.5 MG/L (ref 5.5–18)
BETA-GAMA TOCOPHEROL: 2.7 MG/L (ref 0–6)
COMMENT: NORMAL
RETINOL (VITAMIN A): 0.43 MG/L (ref 0.3–1.2)
RETINYL PALMITATE: <0.02 MG/L (ref 0–0.1)
THIAMINE PYROPHOSPHATE, BLOOD: 149 NMOL/L (ref 70–180)

## 2021-12-30 NOTE — TELEPHONE ENCOUNTER
[employer] message sent. Patient has appt with Dr. Cristela Colón on 1/6/. Please cathy the paper chart for lab review. Thanks!

## 2022-01-01 PROBLEM — Z01.818 PREOPERATIVE CLEARANCE: Status: RESOLVED | Noted: 2020-01-30 | Resolved: 2022-01-01

## 2022-01-06 ENCOUNTER — OFFICE VISIT (OUTPATIENT)
Dept: BARIATRICS/WEIGHT MGMT | Age: 42
End: 2022-01-06
Payer: COMMERCIAL

## 2022-01-06 VITALS
BODY MASS INDEX: 51.89 KG/M2 | WEIGHT: 282 LBS | HEIGHT: 62 IN | HEART RATE: 70 BPM | SYSTOLIC BLOOD PRESSURE: 138 MMHG | DIASTOLIC BLOOD PRESSURE: 83 MMHG | OXYGEN SATURATION: 98 % | RESPIRATION RATE: 18 BRPM

## 2022-01-06 DIAGNOSIS — G47.33 OBSTRUCTIVE SLEEP APNEA: ICD-10-CM

## 2022-01-06 DIAGNOSIS — E66.01 MORBID OBESITY WITH BMI OF 50.0-59.9, ADULT (HCC): Primary | ICD-10-CM

## 2022-01-06 DIAGNOSIS — E78.2 MIXED HYPERLIPIDEMIA: ICD-10-CM

## 2022-01-06 DIAGNOSIS — E66.9 DIABETES MELLITUS TYPE 2 IN OBESE (HCC): ICD-10-CM

## 2022-01-06 DIAGNOSIS — I10 ESSENTIAL HYPERTENSION: ICD-10-CM

## 2022-01-06 DIAGNOSIS — K21.9 CHRONIC GERD: ICD-10-CM

## 2022-01-06 DIAGNOSIS — E11.69 DIABETES MELLITUS TYPE 2 IN OBESE (HCC): ICD-10-CM

## 2022-01-06 PROCEDURE — 99214 OFFICE O/P EST MOD 30 MIN: CPT | Performed by: SURGERY

## 2022-01-06 NOTE — PROGRESS NOTES
Rosa Maria Coe lost 2 lbs over 1 month. Pt has been working to decrease soft drink and sweet intake. Breakfast: WG crackers    Snack: SF jello    Lunch: [employer]    Snack: none    Dinner: [employer] - [employer] or chix nuggets + half large fall + sauces    Snack: sometimes - cranberry & nut trail mix / popcorn     Is pt consuming smaller portions? Trying to be mindful    Is pt consuming at least 64 oz of fluids per day? Yes     Is pt consuming carbonated, caffeinated, or sugary beverages? Yes - coffee with powdered creamer & sweetener / 1-2 soft drinks daily    Has pt sampled Unjury and/or Nectar protein? No, reviewed and encouraged    Has patient attended a support group?  Not scheduled - provided SG schedule    Exercise: walking at work    Plan/Recommendations:   - Eat 4-5x day focusing on protein and produce  - Reduce fast food - try meal planning & prepping using crockpot, sheet pan dinners  - Continue to work to eliminate caffeine / carbonation  - Complete SG    Handouts: Frozen Meals /     Fernanda Velásquez RD, LD

## 2022-01-06 NOTE — PROGRESS NOTES
18   SpO2: 98%   Weight: 282 lb (127.9 kg)   Height: 5' 2\" (1.575 m)       Body mass index is 51.58 kg/m².     Lab Results   Component Value Date    WBC 8.2 12/10/2021    RBC 4.23 12/10/2021    HGB 12.6 12/10/2021    HCT 38.0 12/10/2021    MCV 90.0 12/10/2021    MCH 29.7 12/10/2021    MCHC 33.1 12/10/2021    MPV 8.5 12/10/2021    NEUTOPHILPCT 64.6 01/31/2020    LYMPHOPCT 25 12/10/2021    MONOPCT 6 12/10/2021    MONOPCT 5.6 01/31/2020    EOSRELPCT 3 12/10/2021    BASOPCT 0.7 01/31/2020    NEUTROABS 5.40 12/10/2021    LYMPHSABS 2.10 12/10/2021    MONOSABS 0.50 12/10/2021    EOSABS 0.20 12/10/2021     Lab Results   Component Value Date     01/31/2020    K 4.0 01/31/2020    CL 98 01/31/2020    CO2 27 01/31/2020    ANIONGAP 12 01/31/2020    GLUCOSE 135 01/31/2020    BUN 11 01/31/2020    CREATININE <0.5 01/31/2020    LABGLOM >60 01/31/2020    GFRAA >60 01/31/2020    CALCIUM 8.9 01/31/2020    PROT 6.8 01/31/2020    LABALBU 3.9 01/31/2020    AGRATIO 1.3 01/31/2020    BILITOT 0.6 01/31/2020    ALKPHOS 90 01/31/2020    ALT 17 01/31/2020    AST 16 01/31/2020    GLOB 2.9 01/31/2020     Lab Results   Component Value Date    CHOL 110 12/10/2021    TRIG 96 12/10/2021    HDL 41 12/10/2021    LDLCALC 50 12/10/2021    LABVLDL 26 01/31/2020     Lab Results   Component Value Date    TSHREFLEX 17.12 01/31/2020     Lab Results   Component Value Date    IRON 70 12/10/2021    TIBC 332 12/10/2021    LABIRON 21 12/10/2021     Lab Results   Component Value Date    NRDSIBDV24 516 12/10/2021    FOLATE 11.16 01/31/2020     Lab Results   Component Value Date    VITD25 43.1 12/10/2021     Lab Results   Component Value Date    LABA1C 8.4 12/10/2021     12/10/2021         Current Outpatient Medications:     amphetamine-dextroamphetamine (ADDERALL XR) 10 MG extended release capsule, Take 10 mg by mouth every morning., Disp: , Rfl:     glipiZIDE (GLUCOTROL) 5 MG tablet, Take 5 mg by mouth 2 times daily (before meals), Disp: , Rfl:    metFORMIN (GLUCOPHAGE) 1000 MG tablet, , Disp: , Rfl:     pioglitazone (ACTOS) 30 MG tablet, Take 30 mg by mouth, Disp: , Rfl:     sertraline (ZOLOFT) 100 MG tablet, Take 100 mg by mouth, Disp: , Rfl:     levothyroxine (SYNTHROID) 175 MCG tablet, Take 175 mcg by mouth, Disp: , Rfl:     atorvastatin (LIPITOR) 20 MG tablet, , Disp: , Rfl:     valACYclovir (VALTREX) 500 MG tablet, Take 500 mg by mouth, Disp: , Rfl:     fexofenadine (ALLEGRA) 180 MG tablet, Take 180 mg by mouth, Disp: , Rfl:     Review of Systems - History obtained from the patient  General ROS: negative  Psychological ROS: negative  Ophthalmic ROS: negative  Neurological ROS: negative  ENT ROS: negative  Allergy and Immunology ROS: negative  Hematological and Lymphatic ROS: negative  Endocrine ROS: negative  Breast ROS: negative  Respiratory ROS: negative  Cardiovascular ROS: negative  Gastrointestinal ROS:negative  Genito-Urinary ROS: negative  Musculoskeletal ROS: negative   Skin ROS: negative    Physical Exam   Vitals Reviewed   Constitutional: Patient is oriented to person, place, and time. Patient appears well-developed and well-nourished. Patient is active and cooperative. Non-toxic appearance. No distress. HENT:   Head: Normocephalic and atraumatic. Head is without abrasion and without laceration. Hair is normal.   Right Ear: External ear normal. No lacerations. No drainage, swelling . Left Ear: External ear normal. No lacerations. No drainage, swelling. Nose/Mouth: face mask in place  Eyes: Conjunctivae, EOM and lids are normal. Right eye exhibits no discharge. No foreign body present in the right eye. Left eye exhibits no discharge. No foreign body present in the left eye. No scleral icterus. Neck: Trachea normal and normal range of motion. No JVD present. Pulmonary/Chest: Effort normal. No accessory muscle usage or stridor. No apnea. No respiratory distress. Cardiovascular: Normal rate and no JVD.    Abdominal: Normal appearance. Patient exhibits no distension. Abdomen is soft, obese, non tender. Musculoskeletal: Normal range of motion. Patient exhibits no edema. Neurological: Patient is alert and oriented to person, place, and time. Patient has normal strength. GCS eye subscore is 4. GCS verbal subscore is 5. GCS motor subscore is 6. Skin: Skin is warm and dry. No abrasion and no rash noted. Patient is not diaphoretic. No cyanosis or erythema. Psychiatric: Patient has a normal mood and affect. Speech is normal and behavior is normal. Cognition and memory are normal.       A/P    Flora Manzo is 39 y.o. female, Body mass index is 51.58 kg/m². pre surgery, has lost 2 lbs since last visit. The patient underwent extensive dietary counseling with registered dietician. I have reviewed, discussed and agree with the dietary plan. Patient is trying hard to keep good dietary and behavior modifications. Patient is monitoring portion sizes, food choices and liquid calories. Patient is trying to exercise regularly as much as possible. Obesity as a disease is considered a high risk to patients overall health and should therefore be considered a high risk disease state. Advised the patient that not getting there weight under control, that could increase risk of complications/worsening of those conditions on the long-term. (Goal of weight loss surgery is to alleviate/control some of those co-morbidities)    Now with Covid-19 pandemic, CDC and health authorities does classify obese patients as vulnerable and high risk as well. Which makes weight loss a priority for improvement of their wellbeing and overall health. I encouraged the patient to continue exercise and keeping healthy eating habits. Discussed pre-op labs and work up till now. Also counseled the patient extensively on Surgery.        The visit today, included any number of the following: Bariatric Preoperative work up/protocols, review of labs, imaging, provider notes, outside hospital records, performing examination/evaluation, counseling patient and/or family, ordering medications/tests, placing referrals and communication with referring physicians, coordination of care; discussing dietary plan/recall with the patient as well with registered dietitian and documentation in the EHR. Of note, the above was done during same day of the actual patient encounter. Chuy Gutierrez is here for her 2nd presurgical visit discussed nutrition labs so far. Hemoglobin A1c was slightly up to 8.4 from 7 range. Patient is working with her PCP to adjust.  Patient is working on scheduling the remaining preoperative clearances. We will see the patient next month for continued follow-up. We discussed how her excess weight affects her overall health and importance of weight loss, healthy diet and active lifestyle to alleviate those co morbid conditions, otherwise risk deterioration. Morbid Obesity: Body mass index is 51.58 kg/m². [x] Continue to make dietary and lifestyle modifications. [x] Plan for Future laparoscopic sleeve gastrectomy. [x] Return for follow-up next month. Chronic GERD:   [x] Continue to make dietary and lifestyle modifications. [x] Plan for EGD to evaluate the stomach. Essential Hypertension:  [x] Continue to make dietary and lifestyle modifications. [x] Reviewed the importance of checking blood pressure. [x] Continue to follow up with their PCP for medication management and monitoring. Diabetes Mellitus Type II in Obese:   [x] Continue to make dietary and lifestyle modifications. [x] Reviewed the importance of checking blood sugars. [x] Continue to follow up with their PCP and/or Endocrinologist for medication management and monitoring. Hyperlipidemia:   [x] Continue to make dietary and lifestyle modifications. [x] Continue to follow up with their PCP for medication management and monitoring.     Obstructive Sleep Apnea:   [x] Continue to make dietary and lifestyle modifications. [x] Reviewed the importance of wearing / compliance with CPAP/BIPAP. [x] Continue to follow up with their sleep medicine provider for CPAP/BIPAP management and monitoring. Patient advised that its their responsibility to follow up for studies, referrals and/or labs ordered today.

## 2022-02-04 NOTE — PROGRESS NOTES
Patient x__ reached   _____not reached-preop instructions left on voice mail_____________      [facility]_____[phone]___ [id number]_______ARRIVAL___0630_____      Nothing to eat or drink after midnight the night before and follow a clear liquid diet the day before the EGD. If you do not have the instructions or do not understand them please contact your doctors office. Follow any instructions your doctors office has given you including what medications to take the AM of your procedure and which ones to hold. You may use your inhalers. Follow specific doctors office instructions regarding blood thinners and if they want you to hold and for how long. If you are on a blood thinner and have no instructions please contact the office and ask. Dress comfortably,bring your insurance card,picture ID,and a complete list of medications, including supplements. You must have a responsible adult to stay with you during the procedure,drive you home and stay with you. Spoke to  who took down the instructions, but did have time to review her meds or history. COVID TESTING          _x__ Covid test to be done 3-5 days prior to scheduled surgery -patient aware they are REQUIRED to bring a copy of the negative result DOS-if they receive a positive result to notify their surgeon. If known- indicate where patient is getting covid test done_: unknown_______________________________________________________________________    ___ Rapid - DOS    ___ Other _____________________________________      VISITOR POLICY(subject to change)    There is a one visitor policy at [facility] for all surgeries and endoscopies. Whether the visitor can stay or will be asked to wait in the car will depend on the current policy and if social distancing can be maintained. The policy is subject to change at any time. Please make sure the visitor has a cell phone that is on,charged and able to accept calls, as this may be the way that the staff communicates with them. Pain management is NO VISITOR policyThe patients ride is expected to remain in the car with a cell phone for communication. If the ride is leaving the hospital grounds please make sure they are back in time for pickup. Have the patient inform the staff on arrival what their rides plans are while the patient is in the facility. At the MAIN there is one visitor allowed. Please note that the visitor policy is subject to change.

## 2022-02-11 ENCOUNTER — ANESTHESIA EVENT (OUTPATIENT)
Dept: ENDOSCOPY | Age: 42
End: 2022-02-11
Payer: COMMERCIAL

## 2022-02-11 ENCOUNTER — ANESTHESIA (OUTPATIENT)
Dept: ENDOSCOPY | Age: 42
End: 2022-02-11
Payer: COMMERCIAL

## 2022-02-11 ENCOUNTER — HOSPITAL ENCOUNTER (OUTPATIENT)
Age: 42
Setting detail: OUTPATIENT SURGERY
Discharge: HOME HEALTH | End: 2022-02-11
Attending: SURGERY | Admitting: SURGERY
Payer: COMMERCIAL

## 2022-02-11 VITALS
DIASTOLIC BLOOD PRESSURE: 75 MMHG | OXYGEN SATURATION: 95 % | SYSTOLIC BLOOD PRESSURE: 131 MMHG | RESPIRATION RATE: 14 BRPM

## 2022-02-11 VITALS
HEART RATE: 82 BPM | DIASTOLIC BLOOD PRESSURE: 72 MMHG | TEMPERATURE: 97.1 F | HEIGHT: 62 IN | RESPIRATION RATE: 16 BRPM | OXYGEN SATURATION: 98 % | BODY MASS INDEX: 50.61 KG/M2 | WEIGHT: 275 LBS | SYSTOLIC BLOOD PRESSURE: 122 MMHG

## 2022-02-11 LAB
GLUCOSE BLD-MCNC: 130 MG/DL (ref 70–99)
GLUCOSE BLD-MCNC: 137 MG/DL (ref 70–99)
HCG(URINE) PREGNANCY TEST: NEGATIVE
PERFORMED ON: ABNORMAL
PERFORMED ON: ABNORMAL

## 2022-02-11 PROCEDURE — 3700000000 HC ANESTHESIA ATTENDED CARE: Performed by: SURGERY

## 2022-02-11 PROCEDURE — 2500000003 HC RX 250 WO HCPCS: Performed by: NURSE ANESTHETIST, CERTIFIED REGISTERED

## 2022-02-11 PROCEDURE — 7100000010 HC PHASE II RECOVERY - FIRST 15 MIN: Performed by: SURGERY

## 2022-02-11 PROCEDURE — 88305 TISSUE EXAM BY PATHOLOGIST: CPT

## 2022-02-11 PROCEDURE — 84703 CHORIONIC GONADOTROPIN ASSAY: CPT

## 2022-02-11 PROCEDURE — 3609012400 HC EGD TRANSORAL BIOPSY SINGLE/MULTIPLE: Performed by: SURGERY

## 2022-02-11 PROCEDURE — 2709999900 HC NON-CHARGEABLE SUPPLY: Performed by: SURGERY

## 2022-02-11 PROCEDURE — 7100000011 HC PHASE II RECOVERY - ADDTL 15 MIN: Performed by: SURGERY

## 2022-02-11 PROCEDURE — 2580000003 HC RX 258: Performed by: SURGERY

## 2022-02-11 PROCEDURE — 6360000002 HC RX W HCPCS: Performed by: NURSE ANESTHETIST, CERTIFIED REGISTERED

## 2022-02-11 PROCEDURE — 43239 EGD BIOPSY SINGLE/MULTIPLE: CPT | Performed by: SURGERY

## 2022-02-11 RX ORDER — KETAMINE HCL IN NACL, ISO-OSM 100MG/10ML
SYRINGE (ML) INJECTION PRN
Status: DISCONTINUED | OUTPATIENT
Start: 2022-02-11 | End: 2022-02-11 | Stop reason: SDUPTHER

## 2022-02-11 RX ORDER — LIDOCAINE HYDROCHLORIDE 20 MG/ML
INJECTION, SOLUTION EPIDURAL; INFILTRATION; INTRACAUDAL; PERINEURAL PRN
Status: DISCONTINUED | OUTPATIENT
Start: 2022-02-11 | End: 2022-02-11 | Stop reason: SDUPTHER

## 2022-02-11 RX ORDER — PROPOFOL 10 MG/ML
INJECTION, EMULSION INTRAVENOUS PRN
Status: DISCONTINUED | OUTPATIENT
Start: 2022-02-11 | End: 2022-02-11 | Stop reason: SDUPTHER

## 2022-02-11 RX ORDER — OMEPRAZOLE 20 MG/1
20 CAPSULE, DELAYED RELEASE ORAL DAILY
Qty: 30 CAPSULE | Refills: 3 | Status: SHIPPED | OUTPATIENT
Start: 2022-02-11

## 2022-02-11 RX ORDER — SODIUM CHLORIDE 9 MG/ML
INJECTION, SOLUTION INTRAVENOUS CONTINUOUS
Status: DISCONTINUED | OUTPATIENT
Start: 2022-02-11 | End: 2022-02-11 | Stop reason: HOSPADM

## 2022-02-11 RX ADMIN — Medication 30 MG: at 08:52

## 2022-02-11 RX ADMIN — PROPOFOL 30 MG: 10 INJECTION, EMULSION INTRAVENOUS at 08:56

## 2022-02-11 RX ADMIN — PROPOFOL 120 MG: 10 INJECTION, EMULSION INTRAVENOUS at 08:52

## 2022-02-11 RX ADMIN — SODIUM CHLORIDE: 9 INJECTION, SOLUTION INTRAVENOUS at 07:53

## 2022-02-11 RX ADMIN — PROPOFOL 30 MG: 10 INJECTION, EMULSION INTRAVENOUS at 08:54

## 2022-02-11 RX ADMIN — LIDOCAINE HYDROCHLORIDE 100 MG: 20 INJECTION, SOLUTION EPIDURAL; INFILTRATION; INTRACAUDAL; PERINEURAL at 08:52

## 2022-02-11 ASSESSMENT — PULMONARY FUNCTION TESTS
PIF_VALUE: 1

## 2022-02-11 ASSESSMENT — PAIN SCALES - GENERAL: PAINLEVEL_OUTOF10: 0

## 2022-02-11 NOTE — ANESTHESIA POSTPROCEDURE EVALUATION
Department of Anesthesiology  Postprocedure Note    Patient: Nupur Wood  MRN: [id number]  YOB: 1980  Date of evaluation: 2/11/2022  Time:  9:11 AM     Procedure Summary     Date: 02/11/22 Room / Location: [address]    Anesthesia Start: 6070 Anesthesia Stop: 2542    Procedure: EGD BIOPSY (N/A Abdomen) Diagnosis: (Gastroesophageal reflux disease (GERD) K21.9)    Surgeons: Nikita Drummond MD Responsible Provider: Cindy Gillis MD    Anesthesia Type: MAC ASA Status: 3          Anesthesia Type: MAC    Rosy Phase I: Rosy Score: 10    Rosy Phase II: Rosy Score: 9    Last vitals: Reviewed and per EMR flowsheets.        Anesthesia Post Evaluation    Patient location during evaluation: PACU  Patient participation: complete - patient participated  Level of consciousness: awake  Airway patency: patent  Nausea & Vomiting: no nausea and no vomiting  Complications: no  Cardiovascular status: blood pressure returned to baseline  Respiratory status: acceptable  Hydration status: stable

## 2022-02-11 NOTE — ANESTHESIA PRE PROCEDURE
Department of Anesthesiology  Preprocedure Note       Name:  Eli Johnson   Age:  43 y.o.  :  1980                                          MRN:  [id number]         Date:  2022      Surgeon: Jass Rhodes): Keenan wOen MD    Procedure: Procedure(s):  EGD DIAGNOSTIC ONLY    Medications prior to admission:   Prior to Admission medications    Medication Sig Start Date End Date Taking? Authorizing Provider   amphetamine-dextroamphetamine (ADDERALL XR) 10 MG extended release capsule Take 10 mg by mouth every morning. Historical Provider, MD   glipiZIDE (GLUCOTROL) 5 MG tablet Take 5 mg by mouth 2 times daily (before meals)    Historical Provider, MD   metFORMIN (GLUCOPHAGE) 1000 MG tablet  19   Historical Provider, MD   pioglitazone (ACTOS) 30 MG tablet Take 30 mg by mouth 19   Historical Provider, MD   sertraline (ZOLOFT) 100 MG tablet Take 100 mg by mouth 19   Historical Provider, MD   levothyroxine (SYNTHROID) 175 MCG tablet Take 175 mcg by mouth 17   Historical Provider, MD   atorvastatin (LIPITOR) 20 MG tablet  19   Historical Provider, MD   valACYclovir (VALTREX) 500 MG tablet Take 500 mg by mouth    Historical Provider, MD   fexofenadine (ALLEGRA) 180 MG tablet Take 180 mg by mouth 10/15/19   Historical Provider, MD       Current medications:    Current Facility-Administered Medications   Medication Dose Route Frequency Provider Last Rate Last Admin    0.9 % sodium chloride infusion   IntraVENous Continuous Keenan Owen MD           Allergies:     Allergies   Allergen Reactions    Amoxicillin      Side effect - yeast infection    Prozac [Fluoxetine Hcl]      Left arm numbness       Problem List:    Patient Active Problem List   Diagnosis Code    Diabetes mellitus type 2 in obese ([facility]) E11.69, E66.9    Morbid obesity with BMI of 50.0-59.9, adult ([facility]) E66.01, Z68.43    Essential hypertension I10    Chronic GERD K21.9    Obstructive sleep apnea G47.33    Mixed hyperlipidemia E78.2       Past Medical History:        Diagnosis Date    Abdominal hernia     Anxiety     Chronic GERD 1/30/2020    Depression     Diabetes mellitus type 2 in obese ([facility] 1/30/2020    Essential hypertension 1/30/2020    Hyperlipidemia     Morbid obesity with BMI of 50.0-59.9, adult ([facility] 1/30/2020    Obstructive sleep apnea 1/30/2020    Thyroid disease        Past Surgical History:  History reviewed. No pertinent surgical history. Social History:    Social History     Tobacco Use    Smoking status: Never Smoker    Smokeless tobacco: Never Used   Substance Use Topics    Alcohol use: Not Currently                                Counseling given: Not Answered      Vital Signs (Current):   Vitals:    02/11/22 0710   BP: 131/73   Pulse: 79   Resp: 18   Temp: 97.2 °F (36.2 °C)   TempSrc: Temporal   SpO2: 97%   Weight: 275 lb (124.7 kg)   Height: 5' 2\" (1.575 m)                                              BP Readings from Last 3 Encounters:   02/11/22 131/73   01/06/22 138/83   12/02/21 121/79       NPO Status:  before mn                                                                                BMI:   Wt Readings from Last 3 Encounters:   02/11/22 275 lb (124.7 kg)   01/06/22 282 lb (127.9 kg)   12/02/21 284 lb (128.8 kg)     Body mass index is 50.3 kg/m².     CBC:   Lab Results   Component Value Date    WBC 8.2 12/10/2021    RBC 4.23 12/10/2021    HGB 12.6 12/10/2021    HCT 38.0 12/10/2021    MCV 90.0 12/10/2021    RDW 14.1 12/10/2021     12/10/2021       CMP:   Lab Results   Component Value Date     12/10/2021    K 3.9 12/10/2021     12/10/2021    CO2 30 12/10/2021    BUN 16 12/10/2021    CREATININE 0.55 12/10/2021    GFRAA >60 01/31/2020    AGRATIO 1.3 01/31/2020    LABGLOM >60 01/31/2020    GLUCOSE 94 12/10/2021    PROT 7.1 12/10/2021    CALCIUM 9.0 12/10/2021    BILITOT 0.5 12/10/2021    ALKPHOS 67 12/10/2021    AST 18 12/10/2021    ALT 18 12/10/2021       POC Tests: No results for input(s): POCGLU, POCNA, POCK, POCCL, POCBUN, POCHEMO, POCHCT in the last 72 hours. Coags:   Lab Results   Component Value Date    PROTIME 13.2 12/10/2021    INR 1.0 12/10/2021       HCG (If Applicable):   Lab Results   Component Value Date    PREGTESTUR Negative 02/11/2022        ABGs: No results found for: PHART, PO2ART, LSB4RPT, YVT9SUY, BEART, O6EJNATH     Type & Screen (If Applicable):  No results found for: LABABO, LABRH    Drug/Infectious Status (If Applicable):  No results found for: HIV, HEPCAB    COVID-19 Screening (If Applicable): No results found for: COVID19        Anesthesia Evaluation  Patient summary reviewed  Airway: Mallampati: II  TM distance: >3 FB   Neck ROM: full  Mouth opening: > = 3 FB Dental: normal exam         Pulmonary:normal exam  breath sounds clear to auscultation  (+) sleep apnea:                             Cardiovascular:  Exercise tolerance: good (>4 METS),   (+) hypertension:,         Rhythm: regular  Rate: normal                    Neuro/Psych:   (+) psychiatric history:            GI/Hepatic/Renal:   (+) GERD:,           Endo/Other:    (+) Diabetes, . Abdominal:             Vascular: Other Findings:             Anesthesia Plan      MAC     ASA 3       Induction: intravenous. Anesthetic plan and risks discussed with patient. Plan discussed with CRNA.     Attending anesthesiologist reviewed and agrees with Marina Yepez MD   2/11/2022

## 2022-02-11 NOTE — H&P
Department of [facility] [address] [facility]   [employer]  Attending Pre-operative History and Physical      DIAGNOSIS:  Obesity    INDICATION:  Pre-op    PROCEDURE:  EGD    CHIEF COMPLAINT:  Obesity    History Obtained From:  patient    HISTORY OF PRESENT ILLNESS:    The patient is a 43 y.o. female with significant past medical history of   Patient Active Problem List   Diagnosis    Diabetes mellitus type 2 in obese ([facility]    Morbid obesity with BMI of 50.0-59.9, adult ([facility]    Essential hypertension    Chronic GERD    Obstructive sleep apnea    Mixed hyperlipidemia      who presents for pre-op EGD    Past Medical History:        Diagnosis Date    Abdominal hernia     Anxiety     Chronic GERD 1/30/2020    Depression     Diabetes mellitus type 2 in obese ([facility] 1/30/2020    Essential hypertension 1/30/2020    Hyperlipidemia     Morbid obesity with BMI of 50.0-59.9, adult ([facility] 1/30/2020    Obstructive sleep apnea 1/30/2020    Thyroid disease      Past Surgical History:    History reviewed. No pertinent surgical history. Medications Prior to Admission:   Medications Prior to Admission: amphetamine-dextroamphetamine (ADDERALL XR) 10 MG extended release capsule, Take 10 mg by mouth every morning. glipiZIDE (GLUCOTROL) 5 MG tablet, Take 5 mg by mouth 2 times daily (before meals)  metFORMIN (GLUCOPHAGE) 1000 MG tablet,   pioglitazone (ACTOS) 30 MG tablet, Take 30 mg by mouth  sertraline (ZOLOFT) 100 MG tablet, Take 100 mg by mouth  levothyroxine (SYNTHROID) 175 MCG tablet, Take 175 mcg by mouth  atorvastatin (LIPITOR) 20 MG tablet,   valACYclovir (VALTREX) 500 MG tablet, Take 500 mg by mouth  fexofenadine (ALLEGRA) 180 MG tablet, Take 180 mg by mouth    Allergies:  Amoxicillin and Prozac [fluoxetine hcl]    Social History:   TOBACCO:   reports that she has never smoked. She has never used smokeless tobacco.  ETOH:   reports previous alcohol use.   Family History:       Problem Relation Age of Onset    Hypertension Mother     Stroke Mother     Elevated Lipids Mother     Mental Illness Mother     Hypertension Father     Stroke Father     Elevated Lipids Father     Diabetes Father     Mental Illness Father          REVIEW OF SYSTEMS:    Review of Systems - History obtained from the patient  General ROS: negative  Psychological ROS: negative  Ophthalmic ROS: negative  Neurological ROS: negative  ENT ROS: negative  Allergy and Immunology ROS: negative  Hematological and Lymphatic ROS: negative  Endocrine ROS: negative  Breast ROS: negative  Respiratory ROS: negative  Cardiovascular ROS: negative  Gastrointestinal ROS:negative  Genito-Urinary ROS: negative  Musculoskeletal ROS: negative   Skin ROS: negative      PHYSICAL EXAM:      /73   Pulse 79   Temp 97.2 °F (36.2 °C) (Temporal)   Resp 18   Ht 5' 2\" (1.575 m)   Wt 275 lb (124.7 kg)   LMP 01/05/2022 (Approximate)   SpO2 97%   Breastfeeding No   BMI 50.30 kg/m²  I      Physical Exam   Vitals Reviewed   Constitutional: Patient is oriented to person, place, and time. Patient appears well-developed and well-nourished. Patient is active and cooperative. Non-toxic appearance. No distress. HENT:   Head: Normocephalic and atraumatic. Head is without abrasion and without laceration. Hair is normal.   Right Ear: External ear normal. No lacerations. No drainage, swelling . Left Ear: External ear normal. No lacerations. No drainage, swelling. Nose: Nose normal. No nose lacerations or nasal deformity. Eyes: Conjunctivae, EOM and lids are normal. Right eye exhibits no discharge. No foreign body present in the right eye. Left eye exhibits no discharge. No foreign body present in the left eye. No scleral icterus. Neck: Trachea normal and normal range of motion. No JVD present. Pulmonary/Chest: Effort normal. No accessory muscle usage or stridor. No apnea. No respiratory distress.    Cardiovascular: Normal rate and no JVD. Abdominal: Normal appearance. Patient exhibits no distension. Musculoskeletal: Normal range of motion. Patient exhibits no edema. Neurological: Patient is alert and oriented to person, place, and time. Patient has normal strength. GCS eye subscore is 4. GCS verbal subscore is 5. GCS motor subscore is 6. Skin: Skin is warm and dry. No abrasion and no rash noted. Patient is not diaphoretic. No cyanosis or erythema. Psychiatric: Patient has a normal mood and affect. Speech is normal and behavior is normal. Cognition and memory are normal.       DATA:  CBC:   Lab Results   Component Value Date    WBC 8.2 12/10/2021    RBC 4.23 12/10/2021    HGB 12.6 12/10/2021    HCT 38.0 12/10/2021    MCV 90.0 12/10/2021    MCH 29.7 12/10/2021    MCHC 33.1 12/10/2021    RDW 14.1 12/10/2021     12/10/2021    MPV 8.5 12/10/2021     CMP:    Lab Results   Component Value Date     12/10/2021    K 3.9 12/10/2021     12/10/2021    CO2 30 12/10/2021    BUN 16 12/10/2021    CREATININE 0.55 12/10/2021    GFRAA >60 01/31/2020    AGRATIO 1.3 01/31/2020    LABGLOM >60 01/31/2020    GLUCOSE 94 12/10/2021    PROT 7.1 12/10/2021    LABALBU 3.9 12/10/2021    CALCIUM 9.0 12/10/2021    BILITOT 0.5 12/10/2021    ALKPHOS 67 12/10/2021    AST 18 12/10/2021    ALT 18 12/10/2021       ASSESSMENT AND PLAN:    1. Patient is a 43 y.o. female with above specified procedure planned EGD with deep sedation  2. Procedure options, risks and benefits reviewed with patient. Patient expresses understanding.

## 2022-02-25 ENCOUNTER — OFFICE VISIT (OUTPATIENT)
Dept: SLEEP MEDICINE | Age: 42
End: 2022-02-25
Payer: COMMERCIAL

## 2022-02-25 VITALS
WEIGHT: 274.2 LBS | BODY MASS INDEX: 50.46 KG/M2 | TEMPERATURE: 98.6 F | HEIGHT: 62 IN | SYSTOLIC BLOOD PRESSURE: 138 MMHG | DIASTOLIC BLOOD PRESSURE: 82 MMHG

## 2022-02-25 DIAGNOSIS — R06.89 GASPING FOR BREATH: ICD-10-CM

## 2022-02-25 DIAGNOSIS — R06.81 WITNESSED EPISODE OF APNEA: ICD-10-CM

## 2022-02-25 DIAGNOSIS — E66.01 MORBID OBESITY WITH BMI OF 50.0-59.9, ADULT (HCC): ICD-10-CM

## 2022-02-25 DIAGNOSIS — R06.83 SNORING: ICD-10-CM

## 2022-02-25 DIAGNOSIS — G47.19 EXCESSIVE DAYTIME SLEEPINESS: ICD-10-CM

## 2022-02-25 DIAGNOSIS — G47.30 OBSERVED SLEEP APNEA: Primary | ICD-10-CM

## 2022-02-25 PROCEDURE — 99204 OFFICE O/P NEW MOD 45 MIN: CPT | Performed by: PSYCHIATRY & NEUROLOGY

## 2022-02-25 ASSESSMENT — ENCOUNTER SYMPTOMS
GASTROINTESTINAL NEGATIVE: 1
EYES NEGATIVE: 1
CHOKING: 1
ALLERGIC/IMMUNOLOGIC NEGATIVE: 1
APNEA: 1

## 2022-02-25 ASSESSMENT — SLEEP AND FATIGUE QUESTIONNAIRES
HOW LIKELY ARE YOU TO NOD OFF OR FALL ASLEEP WHEN YOU ARE A PASSENGER IN A CAR FOR AN HOUR WITHOUT A BREAK: 1
HOW LIKELY ARE YOU TO NOD OFF OR FALL ASLEEP IN A CAR, WHILE STOPPED FOR A FEW MINUTES IN TRAFFIC: 1
HOW LIKELY ARE YOU TO NOD OFF OR FALL ASLEEP WHILE LYING DOWN TO REST IN THE AFTERNOON WHEN CIRCUMSTANCES PERMIT: 1
HOW LIKELY ARE YOU TO NOD OFF OR FALL ASLEEP WHILE SITTING AND TALKING TO SOMEONE: 1
HOW LIKELY ARE YOU TO NOD OFF OR FALL ASLEEP WHILE SITTING QUIETLY AFTER LUNCH WITHOUT ALCOHOL: 1
HOW LIKELY ARE YOU TO NOD OFF OR FALL ASLEEP WHILE WATCHING TV: 1
NECK CIRCUMFERENCE (INCHES): 17
ESS TOTAL SCORE: 8
HOW LIKELY ARE YOU TO NOD OFF OR FALL ASLEEP WHILE SITTING INACTIVE IN A PUBLIC PLACE: 1
HOW LIKELY ARE YOU TO NOD OFF OR FALL ASLEEP WHILE SITTING AND READING: 1

## 2022-02-25 NOTE — PROGRESS NOTES
MD TADEO Juares Board Certified in Sleep Medicine  Certified [facility] Sleep Medicine  Board Certified in Neurology [address]  [address], [address] [facility]  [address] [facility], [address] [place] [address]           [address] [facility] [id number] [phone]    Subjective:     Patient ID: Suzanne Vargas is a 43 y.o. female. Chief Complaint   Patient presents with    Sleep Apnea     evaluate for yulia       HPI:        Suzanne Vargas is a 43 y.o. female referred by Dr. Veronika Kauffman for a sleep evaluation. She complains of snoring, snorting, choking, periods of not breathing, tossing and turning, decreased concentration, excessive daytime sleepiness, feels sleepy during the day but she denies knees buckling with laughing, completely or partially paralyzed while falling asleep or waking up, noisy environment, uncomfortable room temperature, uncomfortable bedding. Symptoms began several years ago, gradually worsening since that time. The patient's bed-partner confirmed the snoring and stopped breathing at night  SLEEP SCHEDULE: Goes to bed around 9 PM in the weekdays and 10 PM in the weekends. It usually takes the patient 30 minutes to fall asleep. The patient gets up 2-3 per night to go to the bathroom. The Patient finally gets up at 5 AM during the weekdays and 7 AM in the weekends. patient wakes up with dry mouth and sometimes morning headache. . the headache usually dull headache lasts 30-60 minutes. The patient has restless sleep with frequent arousals in addition to the Patient has significant daytime sleepiness.  The Patient scored Total score: 8 on Ransom Sleepiness Scale ( more than 10 is indicative of daytime sleepiness)and 37 in fatigue scale ( more than 36 is indicative of daytime fatigue). The patient takes no naps. Previous evaluation and treatment has included- none. The Patient has been obese for many years and tried, has gained 50 in the last 5 years,  unsuccessfully to lose weight through diet, exercise. DOT/CDL - N/A  WARREN/Ryan - N/A      Previous Report(s) Reviewed: historical medical records       Social History     Socioeconomic History    Marital status:      Spouse name: Not on file    Number of children: Not on file    Years of education: Not on file    Highest education level: Not on file   Occupational History    Not on file   Tobacco Use    Smoking status: Never Smoker    Smokeless tobacco: Never Used   Substance and Sexual Activity    Alcohol use: Not Currently    Drug use: Not on file    Sexual activity: Not on file   Other Topics Concern    Not on file   Social History Narrative    Not on file     Social Determinants of Health     Financial Resource Strain:     Difficulty of Paying Living Expenses: Not on file   Food Insecurity:     Worried About Running Out of Food in the Last Year: Not on file    Guero of Food in the Last Year: Not on file   Transportation Needs:     Lack of Transportation (Medical): Not on file    Lack of Transportation (Non-Medical):  Not on file   Physical Activity:     Days of Exercise per Week: Not on file    Minutes of Exercise per Session: Not on file   Stress:     Feeling of Stress : Not on file   Social Connections:     Frequency of Communication with Friends and Family: Not on file    Frequency of Social Gatherings with Friends and Family: Not on file    Attends Mandaen Services: Not on file    Active Member of Clubs or Organizations: Not on file    Attends Club or Organization Meetings: Not on file    Marital Status: Not on file   Intimate Partner Violence:     Fear of Current or Ex-Partner: Not on file    Emotionally Abused: Not on file    Physically Abused: Not on file   Julio C Mcintosh Sexually Abused: Not on file   Housing Stability:     Unable to Pay for Housing in the Last Year: Not on file    Number of Places Lived in the Last Year: Not on file    Unstable Housing in the Last Year: Not on file       Prior to Admission medications    Medication Sig Start Date End Date Taking? Authorizing Provider   omeprazole (PRILOSEC) 20 MG delayed release capsule Take 1 capsule by mouth Daily 2/11/22  Yes Med Hannon MD   amphetamine-dextroamphetamine (ADDERALL XR) 10 MG extended release capsule Take 10 mg by mouth every morning.    Yes Historical Provider, MD   glipiZIDE (GLUCOTROL) 5 MG tablet Take 5 mg by mouth 2 times daily (before meals)   Yes Historical Provider, MD   metFORMIN (GLUCOPHAGE) 1000 MG tablet  12/5/19  Yes Historical Provider, MD   pioglitazone (ACTOS) 30 MG tablet Take 30 mg by mouth 2/21/19  Yes Historical Provider, MD   sertraline (ZOLOFT) 100 MG tablet Take 100 mg by mouth 2/21/19  Yes Historical Provider, MD   levothyroxine (SYNTHROID) 175 MCG tablet Take 175 mcg by mouth 5/4/17  Yes Historical Provider, MD   atorvastatin (LIPITOR) 20 MG tablet  12/5/19  Yes Historical Provider, MD   valACYclovir (VALTREX) 500 MG tablet Take 500 mg by mouth   Yes Historical Provider, MD   fexofenadine (ALLEGRA) 180 MG tablet Take 180 mg by mouth 10/15/19  Yes Historical Provider, MD       Allergies as of 02/25/2022 - Fully Reviewed 02/25/2022   Allergen Reaction Noted    Amoxicillin  02/11/2022    Prozac [fluoxetine hcl]  02/11/2022       Patient Active Problem List   Diagnosis    [facility]    Morbid obesity with BMI of 50.0-59.9, adult ([facility]    Essential hypertension    Chronic GERD    Obstructive sleep apnea    Mixed hyperlipidemia       Past Medical History:   Diagnosis Date    Abdominal hernia     Anxiety     Chronic GERD 1/30/2020    Depression     Diabetes mellitus type 2 in obese ([facility] 1/30/2020    Essential hypertension 1/30/2020    Hyperlipidemia     Morbid obesity with BMI of 50.0-59.9, adult ([facility] 1/30/2020    Obstructive sleep apnea 1/30/2020    Thyroid disease        Past Surgical History:   Procedure Laterality Date    UPPER GASTROINTESTINAL ENDOSCOPY N/A 2/11/2022    EGD BIOPSY performed by Haseeb Barreto MD at [address] [place] [address]       Family History   Problem Relation Age of Onset    Hypertension Mother     Stroke Mother     Elevated Lipids Mother     Mental Illness Mother     Hypertension Father     Stroke Father     Elevated Lipids Father     Diabetes Father     Mental Illness Father        Review of Systems   Constitutional: Positive for fatigue and unexpected weight change. HENT: Positive for congestion. Eyes: Negative. Respiratory: Positive for apnea and choking. Cardiovascular: Negative. Gastrointestinal: Negative. Endocrine: Negative. Genitourinary: Positive for frequency (2-3). Musculoskeletal: Negative. Skin: Negative. Allergic/Immunologic: Negative. Neurological: Positive for headaches (AM). Hematological: Negative. Psychiatric/Behavioral: Positive for dysphoric mood. The patient is nervous/anxious. Objective:     Vitals:  Weight BMI Neck circumference    Wt Readings from Last 3 Encounters:   02/25/22 274 lb 3.2 oz (124.4 kg)   02/11/22 275 lb (124.7 kg)   01/06/22 282 lb (127.9 kg)    Body mass index is 50.15 kg/m². Neck circumference (Inches): 17     BP HR SaO2   BP Readings from Last 3 Encounters:   02/25/22 138/82   02/11/22 122/72   02/11/22 131/75    Pulse Readings from Last 3 Encounters:   02/11/22 82   01/06/22 70   12/02/21 67    SpO2 Readings from Last 3 Encounters:   02/11/22 98%   02/11/22 95%   01/06/22 98%        The mandibular molar Class :   []1 []2 []3      Mallampati I Airway Classification:   []1 []2 []3 []4        Physical Exam  Vitals and nursing note reviewed. Constitutional:       Appearance: She is obese. HENT:      Head: Atraumatic. Nose: Nose normal.      Mouth/Throat:      Comments: Mallampati class 1, no retrognathia or hypognathia , normal airflow in bilateral nostrils, no septum deviation , no tonsils enlargement. Eyes:      Extraocular Movements: Extraocular movements intact. Cardiovascular:      Rate and Rhythm: Normal rate and regular rhythm. Pulmonary:      Breath sounds: Normal breath sounds. Musculoskeletal:      Cervical back: Normal range of motion and neck supple. Right lower leg: Edema (trace) present. Left lower leg: Edema (trace) present. Neurological:      General: No focal deficit present. Psychiatric:         Mood and Affect: Mood normal.         Assessment:   Obstructive sleep apnea especially with snoring, snorting,  observed apnea, daytime sleepiness, large neck circumference,and obesity. Diagnosis Orders   1. Observed sleep apnea  Home Sleep Study   2. Morbid obesity with BMI of 50.0-59.9, adult ([facility]  Home Sleep Study   3. Witnessed episode of apnea  Home Sleep Study   4. Snoring  Home Sleep Study   5. Excessive daytime sleepiness  Home Sleep Study   6. Gasping for breath  Home Sleep Study     Plan:     Patient was counseled about the pathophysiology of obstructive sleep apnea syndrome and the methods for evaluating its presence and severity. Patient was counseled to avoid driving and other potentially hazardous circumstances if the patient is experiencing excessive sleepiness. Treatment considerations include the use of nasal CPAP, oral dental appliance or a surgical intervention, which should be based on otolarygologic findings, In the meantime, the patient should be cautioned to avoid the use of alcohol or other depressant medications because of potential for increasing the duration and severity of apnea and cautioned regarding driving or operating and dangerous equipment if the patient is experiencing daytime sleepiness. .  Most likely the patient will benefit from the gastric sleeve surgery in treating of the obstructive sleep apnea. In 2013, in a study published in Obesity Surgery Journal  A total of 69 studies with 13,900 patients were included and revealed that all the procedures achieved profound effects on DAVID, as over 75 % of patients saw at least an improvement in their sleep apnea, bariatric surgery is a definitive treatment for obstructive sleep apnea, regardless of the specific type of surgery. We discussed the proportionality between weight and AHI. With 10% weight change, the AHI has a 27% proportionate change. With 20% weight change, the AHI has a 45-50% proportionate change. Orders Placed This Encounter   Procedures    Home Sleep Study       Return for F/U between 31 and 90 days from the recieving CPAP.     Tommy Simons MD  Medical Director 5 [facility]

## 2022-02-25 NOTE — PATIENT INSTRUCTIONS
Orders Placed This Encounter   Procedures    Home Sleep Study     Standing Status:   Future     Standing Expiration Date:   2/25/2023     Order Specific Question:   Location For Sleep Study     Answer:   [place]     Order Specific Question:   Select Sleep Lab Location     Answer:   [facility]        Patient Education        Sleep Apnea: Care Instructions  Overview     Sleep apnea means that you frequently stop breathing for 10 seconds or longer during sleep. It can be mild to severe, based on the number of times an hour that you stop breathing. Blocked or narrowed airways in your nose, mouth, or throat can cause sleep apnea. Your airway can become blocked when your throat muscles and tongue relax during sleep. You can help treat sleep apnea at home by making lifestyle changes. You also can use a CPAP breathing machine that keeps tissues in the throat from blocking your airway. Or your doctor may suggest that you use a breathing device while you sleep. It helps keep your airway open. This could be a device that you put in your mouth. In some cases, surgery may be needed to remove enlarged tissues in the throat. Follow-up care is a key part of your treatment and safety. Be sure to make and go to all appointments, and call your doctor if you are having problems. It's also a good idea to know your test results and keep a list of the medicines you take. How can you care for yourself at home? · Lose weight, if needed. · Sleep on your side. It may help mild apnea. · Avoid alcohol and medicines such as sleeping pills, opioids, or sedatives before bed. · Don't smoke. If you need help quitting, talk to your doctor. · Prop up the head of your bed. · Treat breathing problems, such as a stuffy nose, that are caused by a cold or allergies. · Try a continuous positive airway pressure (CPAP) breathing machine if your doctor recommends it.   · If CPAP doesn't work for you, ask your doctor if you can try other masks, settings, or breathing machines. · Try oral breathing devices or other nasal devices. · Talk to your doctor if your nose feels dry or bleeds, or if it gets runny or stuffy when you use a breathing machine. · Tell your doctor if you're sleepy during the day and it affects your daily life. Don't drive or operate machinery when you're drowsy. When should you call for help? Watch closely for changes in your health, and be sure to contact your doctor if:    · You still have sleep apnea even though you have made lifestyle changes.     · You are thinking of trying a device such as CPAP.     · You are having problems using a CPAP or similar machine.     · You are still sleepy during the day, and it affects your daily life. Where can you learn more? Go to [email or web address] org and sign in to your Number 100 account. Enter B127 in the [employer] box to learn more about \"Sleep Apnea: Care Instructions. \"     If you do not have an account, please click on the \"Sign Up Now\" link. Current as of: July 6, 2021               Content Version: 13.1  © 9713-9489 [employer]wise, Incorporated. Care instructions adapted under license by [facility] ([facility]). If you have questions about a medical condition or this instruction, always ask your healthcare professional. [address] any warranty or liability for your use of this information.

## 2022-03-07 ENCOUNTER — HOSPITAL ENCOUNTER (OUTPATIENT)
Dept: SLEEP CENTER | Age: 42
Discharge: HOME | End: 2022-03-07
Payer: COMMERCIAL

## 2022-03-07 DIAGNOSIS — G47.19 EXCESSIVE DAYTIME SLEEPINESS: ICD-10-CM

## 2022-03-07 DIAGNOSIS — R06.89 GASPING FOR BREATH: ICD-10-CM

## 2022-03-07 DIAGNOSIS — E66.01 MORBID OBESITY WITH BMI OF 50.0-59.9, ADULT (HCC): ICD-10-CM

## 2022-03-07 DIAGNOSIS — G47.30 OBSERVED SLEEP APNEA: ICD-10-CM

## 2022-03-07 DIAGNOSIS — R06.83 SNORING: ICD-10-CM

## 2022-03-07 DIAGNOSIS — R06.81 WITNESSED EPISODE OF APNEA: ICD-10-CM

## 2022-03-07 PROCEDURE — 95806 SLEEP STUDY UNATT&RESP EFFT: CPT

## 2022-03-10 PROBLEM — G47.30 OBSERVED SLEEP APNEA: Status: ACTIVE | Noted: 2020-01-30

## 2022-03-10 PROCEDURE — 95806 SLEEP STUDY UNATT&RESP EFFT: CPT | Performed by: PSYCHIATRY & NEUROLOGY

## 2022-03-10 NOTE — PROGRESS NOTES
Juan David Vincent         : 1980  [] [facility]     [] [facility]      [] [facility]     [][facility]    [] [facility] U[facility]  [] [facility]   [] Other:  Diagnosis: [x] DAVID (G47.33) [] CSA (G47.31) [] Apnea (G47.30)   Length of Need: [] 12 Months [x] 99 Months [] Other:    Machine (HEATHER!): [] Respironics Dream Station      Auto [x] ResMed AirSense     Auto [] Other:     [x]  CPAP () [] Bilevel ()   Mode: [x] Auto [] Spontaneous    Mode: [] Auto [] Spontaneous           Between 5 and 20 cm                 Comfort Settings:   - Ramp Pressure: 5 cmH2O                                        - Ramp time: 15 min                                     -  Flex/EPR - 3 full time                                    - For ResMed Bilevel (TiMax-4 sec   TiMin- 0.2 sec)     Humidifier: [x] Heated ()        [x] Water chamber replacement ()/ 1 per 6 months        Mask:  Please always start with the mask the patient used during the titraion   [x] Nasal () /1 per 3 months [x] Full Face () /1 per 3 months   [x] Patient choice -Size and fit mask [x] Patient Choice - Size and fit mask   [] Dispense:  [] Dispense:    [x] Headgear () / 1 per 3 months [x] Headgear () / 1 per 3 months   [x] Replacement Nasal Cushion ()/2 per month [x] Interface Replacement ()/1 per month   [x] Replacement Nasal Pillows ()/2 per month         Tubing: [x] Heated ()/1 per 3 months    [] Standard ()/1 per 3 months [] Other:           Filters: [x] Non-disposable ()/1 per 6 months     [x] Ultra-Fine, Disposable ()/2 per month        Miscellaneous: [x] Chin Strap ()/ 1 per 6 months [] O2 bleed-in:       LPM   [] Oximetry on CPAP/Bilevel []  Other:          Start Order Date: 03/10/22    MEDICAL JUSTIFICATION:  I, the undersigned, certify that the above prescribed supplies are medically necessary for this patients wellbeing.   In my opinion, the supplies are both reasonable and necessary in reference to accepted standards of medicalpractice in treatment of this patients condition.     Zahraa Almonte MD      NPI: [id number]       Order Signed Date: 03/10/22    Electronically signed by Zahraa Almonte MD on 3/10/2022 at 2:37 PM

## 2022-03-15 ENCOUNTER — TELEPHONE (OUTPATIENT)
Dept: PULMONOLOGY | Age: 42
End: 2022-03-15

## 2022-03-15 NOTE — TELEPHONE ENCOUNTER
Sleep study showed severe DAVID. AHI was 40.8 per hr. And O2 Desaturations to 70%. Dr Lilliana Moreno:    1. Follow up with the patient's sleep physician to discuss results  2. APAP between 5 and 20 cm   3. Avoid sedatives, alcohol and caffeinated drinks at bedtime. 4. Avoid driving while sleepy. Reminders:   Patient will need appointment 30-45 days after start PAP machine    The patient has been notified of this information and all questions answered.     Patient will call back with DME

## 2022-03-18 ENCOUNTER — TELEPHONE (OUTPATIENT)
Dept: PULMONOLOGY | Age: 42
End: 2022-03-18

## 2022-03-18 NOTE — TELEPHONE ENCOUNTER
SW patient and was informed her DME company is [address] Order will be faxed to them today. Informed the patient to also call back to schedule an appointment with Dr. Roberto Villela in 60-90 days upon receiving and using the CPAP machine.

## 2022-04-25 ENCOUNTER — TELEPHONE (OUTPATIENT)
Dept: BARIATRICS/WEIGHT MGMT | Age: 42
End: 2022-04-25

## 2022-04-26 ENCOUNTER — OFFICE VISIT (OUTPATIENT)
Dept: BARIATRICS/WEIGHT MGMT | Age: 42
End: 2022-04-26
Payer: COMMERCIAL

## 2022-04-26 VITALS
OXYGEN SATURATION: 99 % | RESPIRATION RATE: 18 BRPM | HEIGHT: 62 IN | DIASTOLIC BLOOD PRESSURE: 72 MMHG | HEART RATE: 72 BPM | BODY MASS INDEX: 50.79 KG/M2 | WEIGHT: 276 LBS | SYSTOLIC BLOOD PRESSURE: 124 MMHG

## 2022-04-26 DIAGNOSIS — E11.69 DIABETES MELLITUS TYPE 2 IN OBESE (HCC): ICD-10-CM

## 2022-04-26 DIAGNOSIS — G47.30 OBSERVED SLEEP APNEA: ICD-10-CM

## 2022-04-26 DIAGNOSIS — E78.2 MIXED HYPERLIPIDEMIA: ICD-10-CM

## 2022-04-26 DIAGNOSIS — E66.01 MORBID OBESITY WITH BMI OF 50.0-59.9, ADULT (HCC): Primary | ICD-10-CM

## 2022-04-26 DIAGNOSIS — I10 ESSENTIAL HYPERTENSION: ICD-10-CM

## 2022-04-26 DIAGNOSIS — E66.9 DIABETES MELLITUS TYPE 2 IN OBESE (HCC): ICD-10-CM

## 2022-04-26 DIAGNOSIS — K21.9 CHRONIC GERD: ICD-10-CM

## 2022-04-26 PROCEDURE — 99214 OFFICE O/P EST MOD 30 MIN: CPT | Performed by: SURGERY

## 2022-04-26 NOTE — PROGRESS NOTES
Yohan Fontenot lost 6 lbs over past ~3 months. Pt last seen in January, pt started a new job. Has had her EGD & sleep study. Is pt eating at least 4 times everyday? yes    B- 2 pc toast w/ butter  S- nuts OR trailmix OR cheese stick  L- salad w/ chicken OR sandwich: cheeseburger w/ small fall  S- none  D- chicken OR beef w/ rice or tortilla & vegetables (broccoli or cauliflower or carrots)  S- ice cream    Is pt eating a lean protein source with all meals and snacks? inconsistent    Has pt decreased their portions using the plate method? yes    Is pt choosing low fat/sugar free options? no    Is pt drinking at least 64 oz of clear liquids everyday? vitamin water zero / water     Has pt stopped drinking carbonation, caffeinated, and sugar sweetened beverages? regular coffee w/ splenda & creamer / coke or pepsi zero / seltzer water    Has pt sampled Unjury and/or Nectar protein? did not like it / discussed trying additional flavors    Has pt attended a support group?  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

## 2022-04-26 NOTE — PATIENT INSTRUCTIONS
Patient received dietary handouts and education.     Plan/Recommendations:   - Focus on lean protein 4x/day  - Eliminate caffeine & carbonation  - Try additional protein powder flavors  - Complete Support Group  - Avoid high fat / sugary foods / fried foods

## 2022-05-31 ENCOUNTER — OFFICE VISIT (OUTPATIENT)
Dept: BARIATRICS/WEIGHT MGMT | Age: 42
End: 2022-05-31

## 2022-05-31 VITALS
HEIGHT: 62 IN | OXYGEN SATURATION: 98 % | SYSTOLIC BLOOD PRESSURE: 156 MMHG | WEIGHT: 274 LBS | DIASTOLIC BLOOD PRESSURE: 85 MMHG | RESPIRATION RATE: 18 BRPM | HEART RATE: 74 BPM | BODY MASS INDEX: 50.42 KG/M2

## 2022-05-31 DIAGNOSIS — K21.9 CHRONIC GERD: ICD-10-CM

## 2022-05-31 DIAGNOSIS — E66.01 MORBID OBESITY WITH BMI OF 50.0-59.9, ADULT (HCC): Primary | ICD-10-CM

## 2022-05-31 DIAGNOSIS — E11.69 DIABETES MELLITUS TYPE 2 IN OBESE (HCC): ICD-10-CM

## 2022-05-31 DIAGNOSIS — G47.30 OBSERVED SLEEP APNEA: ICD-10-CM

## 2022-05-31 DIAGNOSIS — I10 ESSENTIAL HYPERTENSION: ICD-10-CM

## 2022-05-31 DIAGNOSIS — E66.9 DIABETES MELLITUS TYPE 2 IN OBESE (HCC): ICD-10-CM

## 2022-05-31 DIAGNOSIS — E78.2 MIXED HYPERLIPIDEMIA: ICD-10-CM

## 2022-05-31 PROCEDURE — 99214 OFFICE O/P EST MOD 30 MIN: CPT | Performed by: SURGERY

## 2022-05-31 NOTE — PROGRESS NOTES
Ac Sims lost 2 lbs over 1 month. She has increased her activity. Is pt eating at least 4 times everyday? yes she is    Is pt eating a lean protein source with all meals and snacks? yes added in string cheese at snacks. frozen breakfast sandwich sausage egg and cheese biscuit without the biscuit but some days does not want to eat breakfast    Has pt decreased their portions using the plate method? yes she is    Is pt choosing low fat/sugar free options? Sugar free liquids, sugar free energy drink    Is pt drinking at least 64 oz of clear liquids everyday? yes water and vitamin water zero    Has pt stopped drinking carbonation, caffeinated, and sugar sweetened beverages? Since last appointment she was drinking 5 cans cola per day, has weaned down to 1 can per day.  Bang SF energy drink     Has pt sampled Unjury and/or Nectar protein? encouraged to try to more flavors mixed with water or lactose free milk    Participating in intentional exercise? 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 protein with breakfast consistently, track steps on watch, continue weaning off caffeine/carbonation/liquid sugars, try more protein powders, Sign up for SG - has list  Handout: protein shakes    Temo Savage, RD, LD

## 2022-05-31 NOTE — PROGRESS NOTES
[facility]) Physicians   [employer]  Rhiannon Valenzuela MD, [address] [facility], [address] [place] [address]    [place]  [id number] . Phone: [phone]  Fax: [phone]          Chief Complaint   Patient presents with    Obesity     4th pre-surg         HPI:     Margot Rivera is a very pleasant 43 y.o. female with Body mass index is 50.12 kg/m². / Chronic Obesity. Lucien Tran has been struggling for several years now with obesity. Lucien Tran feels the weight is an obstacle to achieve and perform things in daily living as well risk on health. Patient  is very determined to lose weight and be healthy, and is working towards  surgical weight loss to achieve this goal. Pre-operative clearance and work up pending. Working hard to keep good dietary habits as well level of activity. Patient denies any nausea, vomiting, fevers, chills, shortness of breath, chest pain, cough, constipation or difficulty urinating.     Pain Assessment   Denies any abdominal pain       Past Medical History:   Diagnosis Date    Abdominal hernia     Anxiety     Chronic GERD 1/30/2020    Depression     Diabetes mellitus type 2 in obese ([facility] [address] 1/30/2020    Essential hypertension 1/30/2020    Hyperlipidemia     Morbid obesity with BMI of 50.0-59.9, adult ([facility] [address] 1/30/2020    Obstructive sleep apnea 1/30/2020    Thyroid disease      Past Surgical History:   Procedure Laterality Date    UPPER GASTROINTESTINAL ENDOSCOPY N/A 2/11/2022    EGD BIOPSY performed by Drew Burnette MD at [address] [place] [address]     Family History   Problem Relation Age of Onset    Hypertension Mother     Stroke Mother     Elevated Lipids Mother     Mental Illness Mother     Hypertension Father     Stroke Father     Elevated Lipids Father     Diabetes Father     Mental Illness Father      Social History     Tobacco Use    Smoking status: Never Smoker    Smokeless tobacco: Never Used   Substance Use Topics    Alcohol use: Not Currently     I counseled the patient on the importance of not smoking and risks of ETOH. Allergies   Allergen Reactions    Amoxicillin      Side effect - yeast infection    Prozac [Fluoxetine Hcl]      Left arm numbness     Vitals:    05/31/22 1443   BP: (!) 156/85   Pulse: 74   Resp: 18   SpO2: 98%   Weight: 274 lb (124.3 kg)   Height: 5' 2\" (1.575 m)       Body mass index is 50.12 kg/m².     Lab Results   Component Value Date    WBC 8.2 12/10/2021    RBC 4.23 12/10/2021    HGB 12.6 12/10/2021    HCT 38.0 12/10/2021    MCV 90.0 12/10/2021    MCH 29.7 12/10/2021    MCHC 33.1 12/10/2021    MPV 8.5 12/10/2021    NEUTOPHILPCT 64.6 01/31/2020    LYMPHOPCT 25 12/10/2021    MONOPCT 6 12/10/2021    MONOPCT 5.6 01/31/2020    EOSRELPCT 3 12/10/2021    BASOPCT 0.7 01/31/2020    NEUTROABS 5.40 12/10/2021    LYMPHSABS 2.10 12/10/2021    MONOSABS 0.50 12/10/2021    EOSABS 0.20 12/10/2021     Lab Results   Component Value Date     12/10/2021    K 3.9 12/10/2021     12/10/2021    CO2 30 12/10/2021    ANIONGAP 4 12/10/2021    GLUCOSE 94 12/10/2021    BUN 16 12/10/2021    CREATININE 0.55 12/10/2021    LABGLOM >60 01/31/2020    GFRAA >60 01/31/2020    CALCIUM 9.0 12/10/2021    PROT 7.1 12/10/2021    LABALBU 3.9 12/10/2021    AGRATIO 1.3 01/31/2020    BILITOT 0.5 12/10/2021    ALKPHOS 67 12/10/2021    ALT 18 12/10/2021    AST 18 12/10/2021    GLOB 2.9 01/31/2020     Lab Results   Component Value Date    CHOL 110 12/10/2021    TRIG 96 12/10/2021    HDL 41 12/10/2021    LDLCALC 50 12/10/2021    LABVLDL 26 01/31/2020     Lab Results   Component Value Date    TSHREFLEX 17.12 01/31/2020     Lab Results   Component Value Date    IRON 70 12/10/2021    TIBC 332 12/10/2021    LABIRON 21 12/10/2021     Lab Results   Component Value Date    VUMJVYGW38 516 12/10/2021    FOLATE 11.16 01/31/2020     Lab Results   Component Value Date    VITD25 43.1 12/10/2021     Lab Results   Component Value Date    LABA1C 8.4 12/10/2021     12/10/2021 Current Outpatient Medications:     omeprazole (PRILOSEC) 20 MG delayed release capsule, Take 1 capsule by mouth Daily, Disp: 30 capsule, Rfl: 3    amphetamine-dextroamphetamine (ADDERALL XR) 10 MG extended release capsule, Take 10 mg by mouth every morning., Disp: , Rfl:     glipiZIDE (GLUCOTROL) 5 MG tablet, Take 5 mg by mouth 2 times daily (before meals), Disp: , Rfl:     metFORMIN (GLUCOPHAGE) 1000 MG tablet, , Disp: , Rfl:     pioglitazone (ACTOS) 30 MG tablet, Take 30 mg by mouth, Disp: , Rfl:     sertraline (ZOLOFT) 100 MG tablet, Take 100 mg by mouth, Disp: , Rfl:     levothyroxine (SYNTHROID) 175 MCG tablet, Take 175 mcg by mouth, Disp: , Rfl:     atorvastatin (LIPITOR) 20 MG tablet, , Disp: , Rfl:     valACYclovir (VALTREX) 500 MG tablet, Take 500 mg by mouth, Disp: , Rfl:     fexofenadine (ALLEGRA) 180 MG tablet, Take 180 mg by mouth, Disp: , Rfl:     Review of Systems - History obtained from the patient  General ROS: negative  Psychological ROS: negative  Ophthalmic ROS: negative  Neurological ROS: negative  ENT ROS: negative  Allergy and Immunology ROS: negative  Hematological and Lymphatic ROS: negative  Endocrine ROS: negative  Breast ROS: negative  Respiratory ROS: negative  Cardiovascular ROS: negative  Gastrointestinal ROS:negative  Genito-Urinary ROS: negative  Musculoskeletal ROS: negative   Skin ROS: negative    Physical Exam   Vitals Reviewed   Constitutional: Patient is oriented to person, place, and time. Patient appears well-developed and well-nourished. Patient is active and cooperative. Non-toxic appearance. No distress. HENT:   Head: Normocephalic and atraumatic. Head is without abrasion and without laceration. Hair is normal.   Right Ear: External ear normal. No lacerations. No drainage, swelling . Left Ear: External ear normal. No lacerations. No drainage, swelling.    Nose/Mouth: face mask in place  Eyes: Conjunctivae, EOM and lids are normal. Right eye exhibits no discharge. No foreign body present in the right eye. Left eye exhibits no discharge. No foreign body present in the left eye. No scleral icterus. Neck: Trachea normal and normal range of motion. No JVD present. Pulmonary/Chest: Effort normal. No accessory muscle usage or stridor. No apnea. No respiratory distress. Cardiovascular: Normal rate and no JVD. Abdominal: Normal appearance. Patient exhibits no distension. Abdomen is soft, obese, non tender. Musculoskeletal: Normal range of motion. Patient exhibits no edema. Neurological: Patient is alert and oriented to person, place, and time. Patient has normal strength. GCS eye subscore is 4. GCS verbal subscore is 5. GCS motor subscore is 6. Skin: Skin is warm and dry. No abrasion and no rash noted. Patient is not diaphoretic. No cyanosis or erythema. Psychiatric: Patient has a normal mood and affect. Speech is normal and behavior is normal. Cognition and memory are normal.       A/P    Gisselle Robertson is 43 y.o. female, Body mass index is 50.12 kg/m². pre surgery, has lost 2 lbs since last visit. The patient underwent extensive dietary counseling with registered dietician. I have reviewed, discussed and agree with the dietary plan. Patient is trying hard to keep good dietary and behavior modifications. Patient is monitoring portion sizes, food choices and liquid calories. Patient is trying to exercise regularly as much as possible. Obesity as a disease is considered a high risk to patients overall health and should therefore be considered a high risk disease state. Advised the patient that not getting there weight under control, that could increase risk of complications/worsening of those conditions on the long-term. (Goal of weight loss surgery is to alleviate/control some of those co-morbidities)    Now with Covid-19 pandemic, CDC and health authorities does classify obese patients as vulnerable and high risk as well.   Which makes weight loss a priority for improvement of their wellbeing and overall health. I encouraged the patient to continue exercise and keeping healthy eating habits. Discussed pre-op labs and work up till now. Also counseled the patient extensively on Surgery. The visit today, included any number of the following: Bariatric Preoperative work up/protocols, review of labs, imaging, provider notes, outside hospital records, performing examination/evaluation, counseling patient and/or family, ordering medications/tests, placing referrals and communication with referring physicians, coordination of care; discussing dietary plan/recall with the patient as well with registered dietitian and documentation in the EHR. Of note, the above was done during same day of the actual patient encounter. Smiley Cobb is here for her fourth presurgical visit. Patient still working her preoperative clearances. Patient need to try more protein as she did not tolerate it mixing it with water so she will try with 1% milk. We will see the patient next month for continued follow-up. We discussed how her excess weight affects her overall health and importance of weight loss, healthy diet and active lifestyle to alleviate those co morbid conditions, otherwise risk deterioration. Morbid Obesity: Body mass index is 50.12 kg/m². [x] Continue to make dietary and lifestyle modifications. [x] Plan for Future laparoscopic sleeve gastrectomy. [x] Return for follow-up next month. Chronic GERD:   [x] Continue to make dietary and lifestyle modifications. [x] Continue Omeprazole. Essential Hypertension:  [x] Continue to make dietary and lifestyle modifications. [x] Reviewed the importance of checking blood pressure. [x] Continue to follow up with their PCP for medication management and monitoring. Diabetes Mellitus Type II in Obese:   [x] Continue to make dietary and lifestyle modifications.   [x] Reviewed the importance of

## 2022-06-07 ENCOUNTER — HOSPITAL ENCOUNTER (OUTPATIENT)
Dept: ULTRASOUND IMAGING | Age: 42
Discharge: HOME | End: 2022-06-07
Payer: COMMERCIAL

## 2022-06-07 DIAGNOSIS — G47.33 OBSTRUCTIVE SLEEP APNEA (ADULT) (PEDIATRIC): ICD-10-CM

## 2022-06-07 DIAGNOSIS — E11.69 DIABETES MELLITUS ASSOCIATED WITH HORMONAL ETIOLOGY (HCC): ICD-10-CM

## 2022-06-07 DIAGNOSIS — Z01.818 PRE-OP EXAM: ICD-10-CM

## 2022-06-07 DIAGNOSIS — Z01.818 PREOP EXAMINATION: ICD-10-CM

## 2022-06-07 DIAGNOSIS — E66.9 DIABETES MELLITUS TYPE 2 IN OBESE (HCC): ICD-10-CM

## 2022-06-07 DIAGNOSIS — E11.69 DIABETES MELLITUS TYPE 2 IN OBESE (HCC): ICD-10-CM

## 2022-06-07 DIAGNOSIS — E66.9 LIFELONG OBESITY: ICD-10-CM

## 2022-06-07 DIAGNOSIS — G47.33 OBSTRUCTIVE SLEEP APNEA: ICD-10-CM

## 2022-06-07 DIAGNOSIS — E66.9 DIABETES MELLITUS TYPE 2 IN OBESE (HCC): Primary | ICD-10-CM

## 2022-06-07 DIAGNOSIS — E11.69 DIABETES MELLITUS TYPE 2 IN OBESE (HCC): Primary | ICD-10-CM

## 2022-06-07 PROCEDURE — 76705 ECHO EXAM OF ABDOMEN: CPT

## 2022-06-08 NOTE — PROGRESS NOTES
[address]  6/14/22  Referring: Dr. Banda Agent CONSULT/CHIEF COMPLAINT/HPI     Reason for visit/ Chief complaint     Chief Complaint   Patient presents with    Cardiac Clearance     Procedure Bariatric  Surgery  date TBD     Hypertension    Hyperlipidemia       HPI Olu Scherer is a 43 y.o. seen for pre op cardiac evaluation prior to weight loss surgery. Surgery has not been scheduled yet. Medical history includes obesity, HTN, hyperlipidemia, diabetes type 2, sleep apnea, GERD. She does not smoke. Radha Nathan denies any chest pain, palpitations, dizziness, CAMPBELL, or edema. She denies any family history of early heart disease. Patient is adherent with medications and is tolerating them well without side effects     HISTORY/ALLERGIES/ROS     MedHx:  has a past medical history of Abdominal hernia, Anxiety, Chronic GERD, Depression, Diabetes mellitus type 2 in [facility]), Essential hypertension, Hyperlipidemia, Morbid obesity with BMI of 50.0-59.9, adult ([address] Obstructive sleep apnea, and Thyroid disease. SurgHx:  has a past surgical history that includes Upper gastrointestinal endoscopy (N/A, 2/11/2022). SocHx:  reports that she has never smoked. She has never used smokeless tobacco. She reports previous alcohol use. She reports that she does not use drugs. FamHx: No family history of premature coronary artery disease, sudden death, or aneurysm  Allergies: Amoxicillin and Prozac [fluoxetine hcl]     MEDICATIONS      Prior to Admission medications    Medication Sig Start Date End Date Taking?  Authorizing Provider   LEVEMIR FLEXTOUCH 100 UNIT/ML injection pen  4/26/22  Yes Historical Provider, MD GUERRERO PEN NEEDLES 31G X 5 MM [id number]  4/26/22  Yes Historical Provider, MD   lisinopril (PRINIVIL;ZESTRIL) 10 MG tablet  4/30/22  Yes Historical Provider, MD   oxybutynin (DITROPAN-XL) 10 MG extended release tablet  3/24/22  Yes Historical Provider, MD   omeprazole (PRILOSEC) 20 MG delayed release capsule Take 1 capsule by mouth Daily 2/11/22  Yes Corin Ramírez MD   amphetamine-dextroamphetamine (ADDERALL XR) 10 MG extended release capsule Take 10 mg by mouth every morning. Yes Historical Provider, MD   glipiZIDE (GLUCOTROL) 5 MG tablet Take 5 mg by mouth 2 times daily (before meals)   Yes Historical Provider, MD   metFORMIN (GLUCOPHAGE) 1000 MG tablet  12/5/19  Yes Historical Provider, MD   pioglitazone (ACTOS) 30 MG tablet Take 30 mg by mouth 2/21/19  Yes Historical Provider, MD   sertraline (ZOLOFT) 100 MG tablet Take 100 mg by mouth 2/21/19  Yes Historical Provider, MD   levothyroxine (SYNTHROID) 175 MCG tablet Take 175 mcg by mouth 5/4/17  Yes Historical Provider, MD   atorvastatin (LIPITOR) 20 MG tablet  12/5/19  Yes Historical Provider, MD   valACYclovir (VALTREX) 500 MG tablet Take 500 mg by mouth   Yes Historical Provider, MD   fexofenadine (ALLEGRA) 180 MG tablet Take 180 mg by mouth 10/15/19  Yes Historical Provider, MD       PHYSICAL EXAM        Vitals:    06/14/22 1359   BP: 120/60   Pulse: 88   Resp: 20   SpO2: 98%    Weight: 272 lb 6.4 oz (123.6 kg)     Gen Alert, cooperative, no distress Heart  Regular rate and rhythm, no murmur   Head Normocephalic, atraumatic, no abnormalities Abd  Soft, NT, +BS, no mass, no OM   Eyes PERRLA, conj/corn clear Ext  Ext nl, AT, no C/C, no edema   Nose Nares normal, no drain age, Non-tender Pulse 2+ and symmetric   Throat Lips, mucosa, tongue normal Skin Color/text/turg nl, no rash/lesions   Neck S/S, TM, NT, no bruit Psych Nl mood and affect   Lung CTA-B, unlabored, no DTP     Ch wall NT, no deform       LABS and Imaging     Relevant and available CV data reviewed    EKG personally interpreted: NSR normal ECG    Lipids 12/10/21: ; TRIG 96; HDL 41; LDL 50      Echo/MRI: none    Stress:none    Cath: none    Holter: none      ASSESSMENT AND PLAN     1. Pre-op Cardiac eval  Plan:  The patient has a BMI > 40 and/or a BMI > 35 with obesity-related comorbidities and I agree that bariatric surgery would improve their quality of life significantly. Regarding ability to safely undergo bariatric surgery from a cardiac risk standpoint:    The patient has normal exercise tolerance, able to do > 4 METS easily, has a normal ECG, and has no red-flags on family history to be of concern and thus is LOW-RISK for bariatric surgery. No further cardiac evaluation is needed before surgery. 2.HTN  ~ on Lisinopril 10mg daily   Plan: continue current medication    3. HLD  ~ on Lipitor 20mg daily   Plan: continue current medication    4. Morbid Obesity, BMI 45-50   Plan:  Agree with bariatric surgery    Patient counseled on lifestyle modification, diet, and exercise. Follow Up: IRMA Burleson MD  Cardiologist [facility]    Scribe's attestation: This note was scribed in the presence of Dr Gerson Yeung MD by Lisa Simomns RN. Physician Attestation  The scribe wrote this note in the presence of me Angelo Burleson MD). The scribe may have prepopulated components of this note with my historical  intellectual property under my direct supervision. Any additions to this intellectual property were performed in my presence and at my direction. Furthermore, the content and accuracy of this note have been reviewed by me with edits by me as needed.   Angelo Burleson MD 6/14/2022 2:36 PM

## 2022-06-14 ENCOUNTER — OFFICE VISIT (OUTPATIENT)
Dept: CARDIOLOGY CLINIC | Age: 42
End: 2022-06-14
Payer: COMMERCIAL

## 2022-06-14 VITALS
SYSTOLIC BLOOD PRESSURE: 120 MMHG | OXYGEN SATURATION: 98 % | BODY MASS INDEX: 50.13 KG/M2 | DIASTOLIC BLOOD PRESSURE: 60 MMHG | RESPIRATION RATE: 20 BRPM | HEART RATE: 88 BPM | WEIGHT: 272.4 LBS | HEIGHT: 62 IN

## 2022-06-14 DIAGNOSIS — I10 BENIGN ESSENTIAL HTN: Primary | ICD-10-CM

## 2022-06-14 DIAGNOSIS — Z01.818 PRE-OP EVALUATION: ICD-10-CM

## 2022-06-14 DIAGNOSIS — I10 ESSENTIAL HYPERTENSION: ICD-10-CM

## 2022-06-14 DIAGNOSIS — E66.01 MORBID OBESITY WITH BMI OF 50.0-59.9, ADULT (HCC): ICD-10-CM

## 2022-06-14 DIAGNOSIS — E78.2 MIXED HYPERLIPIDEMIA: ICD-10-CM

## 2022-06-14 DIAGNOSIS — Z01.818 PREOPERATIVE CLEARANCE: ICD-10-CM

## 2022-06-14 PROCEDURE — 99203 OFFICE O/P NEW LOW 30 MIN: CPT | Performed by: INTERNAL MEDICINE

## 2022-06-14 PROCEDURE — 93000 ELECTROCARDIOGRAM COMPLETE: CPT | Performed by: INTERNAL MEDICINE

## 2022-06-14 RX ORDER — INSULIN DETEMIR 100 [IU]/ML
INJECTION, SOLUTION SUBCUTANEOUS
COMMUNITY
Start: 2022-04-26

## 2022-06-14 RX ORDER — OXYBUTYNIN CHLORIDE 10 MG/1
10 TABLET, EXTENDED RELEASE ORAL DAILY
COMMUNITY
Start: 2022-03-24

## 2022-06-14 RX ORDER — PEN NEEDLE, DIABETIC 31 GX3/16"
NEEDLE, DISPOSABLE MISCELLANEOUS
COMMUNITY
Start: 2022-04-26

## 2022-06-14 RX ORDER — LISINOPRIL 10 MG/1
10 TABLET ORAL DAILY
COMMUNITY
Start: 2022-04-30

## 2022-06-15 ENCOUNTER — TELEPHONE (OUTPATIENT)
Dept: BARIATRICS/WEIGHT MGMT | Age: 42
End: 2022-06-15

## 2022-06-15 NOTE — TELEPHONE ENCOUNTER
Says she got billed for her visits because they weren't charged to her insurance    dates where for June 6th and may 31 2022

## 2022-06-16 ENCOUNTER — PATIENT MESSAGE (OUTPATIENT)
Dept: BARIATRICS/WEIGHT MGMT | Age: 42
End: 2022-06-16

## 2022-06-16 NOTE — TELEPHONE ENCOUNTER
Sent email to billing to request that the Dates of service for 5/31 and 1/6/2022 are submitted to insurance and to follow up with patient. I will advise patient of this information.

## 2022-06-17 NOTE — TELEPHONE ENCOUNTER
Received response back from [employer] (billing)    I apologize for my last email, what I had meant to say was service date 1/6/22 was currently pending [employer] processing. On service date 5/31/22 the entire claim was marked as Do Not Bill. I was able to split the charges sending the office visit performed to [facility] for processing, leaving the patient supplements bought that day as self pay and paid. Please don't hesitate to reach out with any further questions, I have noted the account for future reference. Have a wonderful day. Howie Sullivan      I will send a [employer] message to patient to advise it's being corrected.

## 2022-06-17 NOTE — TELEPHONE ENCOUNTER
Fiordaliza Ellington,    I received an email back from billing. Stating service date 1/6/22 was currently pending [employer] processing. On service date 5/31/22 the entire claim was marked as Do Not Bill. Billing was able to split the charges sending the office visit performed to [facility] for processing, leaving the patient supplements bought that day as self pay and paid. If you have any additional questions, please do not hesitate to reach out.     Thank you,  Practice Manager  Claudia Kaplan

## 2022-06-21 ENCOUNTER — OFFICE VISIT (OUTPATIENT)
Dept: BARIATRICS/WEIGHT MGMT | Age: 42
End: 2022-06-21

## 2022-06-21 VITALS
DIASTOLIC BLOOD PRESSURE: 73 MMHG | HEIGHT: 62 IN | OXYGEN SATURATION: 99 % | RESPIRATION RATE: 18 BRPM | BODY MASS INDEX: 49.13 KG/M2 | WEIGHT: 267 LBS | SYSTOLIC BLOOD PRESSURE: 123 MMHG | HEART RATE: 68 BPM

## 2022-06-21 DIAGNOSIS — E11.69 DIABETES MELLITUS TYPE 2 IN OBESE (HCC): ICD-10-CM

## 2022-06-21 DIAGNOSIS — I10 ESSENTIAL HYPERTENSION: ICD-10-CM

## 2022-06-21 DIAGNOSIS — K21.9 CHRONIC GERD: ICD-10-CM

## 2022-06-21 DIAGNOSIS — E66.01 MORBID OBESITY WITH BMI OF 50.0-59.9, ADULT (HCC): Primary | ICD-10-CM

## 2022-06-21 DIAGNOSIS — G47.30 OBSERVED SLEEP APNEA: ICD-10-CM

## 2022-06-21 DIAGNOSIS — E66.9 DIABETES MELLITUS TYPE 2 IN OBESE (HCC): ICD-10-CM

## 2022-06-21 DIAGNOSIS — E78.2 MIXED HYPERLIPIDEMIA: ICD-10-CM

## 2022-06-21 PROCEDURE — 99214 OFFICE O/P EST MOD 30 MIN: CPT | Performed by: SURGERY

## 2022-06-21 NOTE — PROGRESS NOTES
Elpidio Norris lost 7 lbs over past month. Is pt eating at least 4 times everyday? Yes     Is pt eating a lean protein source with all meals and snacks? Not always    B - fruit with coffee  S - sometimes - KIND bar   L - salad with chicken   D - soup (similar to ramen noodles) sometimes with slice of bread   S - SF popsicle     Has pt decreased their portions using the plate method? Reports they are smaller     Is pt choosing low fat/sugar free options? Overall     Is pt drinking at least 64 oz of clear liquids everyday? Yes     Has pt stopped drinking carbonation, caffeinated, and sugar sweetened beverages? No - eliminated Bang SF energy drink, 1 can soda per day, regular coffee less frequently with creamer and splenda, water, gatorade zero     Has pt sampled Unjury and/or Nectar protein? Needs to try more flavors, liked the chocolate     Has pt attended a support group? Not yet, discussed today     Participating in intentional exercise? 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 Has a smart watch but is not tracking steps. Walking a lot as part of new job.       Plan/Recommendations:   Include protein with all meals/snacks  Switch KIND bar to protein bar   Eliminate coffee OR switch to decaf + soda   Try more protein powders   Attend SG     Handouts: none     Janette Carvalho, CHAY, LD

## 2022-06-21 NOTE — PROGRESS NOTES
[facility]) Physicians   [employer]  Akila Mclain MD, [address] [facility], [address] [place] [address]    [address] [id number] . Phone: [phone]  Fax: [phone]          Chief Complaint   Patient presents with    Obesity     5th pre-surg         HPI:     Sunday Manoj is a very pleasant 43 y.o. female with Body mass index is 48.83 kg/m². / Chronic Obesity. [facility] has been struggling for several years now with obesity. [facility] the [facility] is an obstacle to achieve and perform things in daily living as well risk on health. Patient  is very determined to lose weight and be healthy, and is working towards  surgical weight loss to achieve this goal. Pre-operative clearance and work up pending. Working hard to keep good dietary habits as well level of activity. Patient denies any nausea, vomiting, fevers, chills, shortness of breath, chest pain, cough, constipation or difficulty urinating.     Pain Assessment   Denies any abdominal pain       Past Medical History:   Diagnosis Date    Abdominal hernia     Anxiety     Chronic GERD 1/30/2020    Depression     Diabetes mellitus type 2 in obese ([facility] [address] 1/30/2020    Essential hypertension 1/30/2020    Hyperlipidemia     Morbid obesity with BMI of 50.0-59.9, adult ([facility] [address] 1/30/2020    Obstructive sleep apnea 1/30/2020    Thyroid disease      Past Surgical History:   Procedure Laterality Date    UPPER GASTROINTESTINAL ENDOSCOPY N/A 2/11/2022    EGD BIOPSY performed by Chaparrita Kong MD at [address] [place] [address]     Family History   Problem Relation Age of Onset    Hypertension Mother     Stroke Mother     Elevated Lipids Mother     Mental Illness Mother     Hypertension Father     Stroke Father     Elevated Lipids Father     Diabetes Father     Mental Illness Father      Social History     Tobacco Use    Smoking status: Never Smoker    Smokeless tobacco: Never Used   Substance Use Topics    Alcohol use: Not Currently     I counseled the patient on the importance of not smoking and risks of ETOH. Allergies   Allergen Reactions    Amoxicillin      Side effect - yeast infection    Prozac [Fluoxetine Hcl]      Left arm numbness     Vitals:    06/21/22 0820   BP: 123/73   Pulse: 68   Resp: 18   SpO2: 99%   Weight: 267 lb (121.1 kg)   Height: 5' 2\" (1.575 m)       Body mass index is 48.83 kg/m².     Lab Results   Component Value Date    WBC 8.2 12/10/2021    RBC 4.23 12/10/2021    HGB 12.6 12/10/2021    HCT 38.0 12/10/2021    MCV 90.0 12/10/2021    MCH 29.7 12/10/2021    MCHC 33.1 12/10/2021    MPV 8.5 12/10/2021    NEUTOPHILPCT 64.6 01/31/2020    LYMPHOPCT 25 12/10/2021    MONOPCT 6 12/10/2021    MONOPCT 5.6 01/31/2020    EOSRELPCT 3 12/10/2021    BASOPCT 0.7 01/31/2020    NEUTROABS 5.40 12/10/2021    LYMPHSABS 2.10 12/10/2021    MONOSABS 0.50 12/10/2021    EOSABS 0.20 12/10/2021     Lab Results   Component Value Date     12/10/2021    K 3.9 12/10/2021     12/10/2021    CO2 30 12/10/2021    ANIONGAP 4 12/10/2021    GLUCOSE 94 12/10/2021    BUN 16 12/10/2021    CREATININE 0.55 12/10/2021    LABGLOM >60 01/31/2020    GFRAA >60 01/31/2020    CALCIUM 9.0 12/10/2021    PROT 7.1 12/10/2021    LABALBU 3.9 12/10/2021    AGRATIO 1.3 01/31/2020    BILITOT 0.5 12/10/2021    ALKPHOS 67 12/10/2021    ALT 18 12/10/2021    AST 18 12/10/2021    GLOB 2.9 01/31/2020     Lab Results   Component Value Date    CHOL 110 12/10/2021    TRIG 96 12/10/2021    HDL 41 12/10/2021    LDLCALC 50 12/10/2021    LABVLDL 26 01/31/2020     Lab Results   Component Value Date    TSHREFLEX 17.12 01/31/2020     Lab Results   Component Value Date    IRON 70 12/10/2021    TIBC 332 12/10/2021    LABIRON 21 12/10/2021     Lab Results   Component Value Date    WOJQKIUV54 516 12/10/2021    FOLATE 11.16 01/31/2020     Lab Results   Component Value Date    VITD25 43.1 12/10/2021     Lab Results   Component Value Date    LABA1C 8.4 12/10/2021     12/10/2021 Current Outpatient Medications:     LEVEMIR FLEXTOUCH 100 UNIT/ML injection pen, , Disp: , Rfl:     [employer] PEN NEEDLES 31G X 5 MM MISC, , Disp: , Rfl:     lisinopril (PRINIVIL;ZESTRIL) 10 MG tablet, , Disp: , Rfl:     oxybutynin (DITROPAN-XL) 10 MG extended release tablet, , Disp: , Rfl:     omeprazole (PRILOSEC) 20 MG delayed release capsule, Take 1 capsule by mouth Daily, Disp: 30 capsule, Rfl: 3    amphetamine-dextroamphetamine (ADDERALL XR) 10 MG extended release capsule, Take 10 mg by mouth every morning., Disp: , Rfl:     glipiZIDE (GLUCOTROL) 5 MG tablet, Take 5 mg by mouth 2 times daily (before meals), Disp: , Rfl:     metFORMIN (GLUCOPHAGE) 1000 MG tablet, , Disp: , Rfl:     pioglitazone (ACTOS) 30 MG tablet, Take 30 mg by mouth, Disp: , Rfl:     sertraline (ZOLOFT) 100 MG tablet, Take 100 mg by mouth, Disp: , Rfl:     levothyroxine (SYNTHROID) 175 MCG tablet, Take 175 mcg by mouth, Disp: , Rfl:     atorvastatin (LIPITOR) 20 MG tablet, , Disp: , Rfl:     valACYclovir (VALTREX) 500 MG tablet, Take 500 mg by mouth, Disp: , Rfl:     fexofenadine (ALLEGRA) 180 MG tablet, Take 180 mg by mouth, Disp: , Rfl:     Review of Systems - History obtained from the patient  General ROS: negative  Psychological ROS: negative  Ophthalmic ROS: negative  Neurological ROS: negative  ENT ROS: negative  Allergy and Immunology ROS: negative  Hematological and Lymphatic ROS: negative  Endocrine ROS: negative  Breast ROS: negative  Respiratory ROS: negative  Cardiovascular ROS: negative  Gastrointestinal ROS:negative  Genito-Urinary ROS: negative  Musculoskeletal ROS: negative   Skin ROS: negative    Physical Exam   Vitals Reviewed   Constitutional: Patient is oriented to person, place, and time. Patient appears well-developed and well-nourished. Patient is active and cooperative. Non-toxic appearance. No distress. HENT:   Head: Normocephalic and atraumatic. Head is without abrasion and without laceration. Hair is normal.   Right Ear: External ear normal. No lacerations. No drainage, swelling . Left Ear: External ear normal. No lacerations. No drainage, swelling. Nose/Mouth: face mask in place  Eyes: Conjunctivae, EOM and lids are normal. Right eye exhibits no discharge. No foreign body present in the right eye. Left eye exhibits no discharge. No foreign body present in the left eye. No scleral icterus. Neck: Trachea normal and normal range of motion. No JVD present. Pulmonary/Chest: Effort normal. No accessory muscle usage or stridor. No apnea. No respiratory distress. Cardiovascular: Normal rate and no JVD. Abdominal: Normal appearance. Patient exhibits no distension. Abdomen is soft, obese, non tender. Musculoskeletal: Normal range of motion. Patient exhibits no edema. Neurological: Patient is alert and oriented to person, place, and time. Patient has normal strength. GCS eye subscore is 4. GCS verbal subscore is 5. GCS motor subscore is 6. Skin: Skin is warm and dry. No abrasion and no rash noted. Patient is not diaphoretic. No cyanosis or erythema. Psychiatric: Patient has a normal mood and affect. Speech is normal and behavior is normal. Cognition and memory are normal.       A/P    Leigh Banegas is 43 y.o. female, Body mass index is 48.83 kg/m². pre surgery, has lost 7 lbs since last visit. The patient underwent extensive dietary counseling with registered dietician. I have reviewed, discussed and agree with the dietary plan. Patient is trying hard to keep good dietary and behavior modifications. Patient is monitoring portion sizes, food choices and liquid calories. Patient is trying to exercise regularly as much as possible. Obesity as a disease is considered a high risk to patients overall health and should therefore be considered a high risk disease state.    Advised the patient that not getting there weight under control, that could increase risk of complications/worsening of those conditions on the long-term. (Goal of weight loss surgery is to alleviate/control some of those co-morbidities)    Now with Covid-19 pandemic, CDC and health authorities does classify obese patients as vulnerable and high risk as well. Which makes weight loss a priority for improvement of their wellbeing and overall health. I encouraged the patient to continue exercise and keeping healthy eating habits. Discussed pre-op labs and work up till now. Also counseled the patient extensively on Surgery. The visit today, included any number of the following: Bariatric Preoperative work up/protocols, review of labs, imaging, provider notes, outside hospital records, performing examination/evaluation, counseling patient and/or family, ordering medications/tests, placing referrals and communication with referring physicians, coordination of care; discussing dietary plan/recall with the patient as well with registered dietitian and documentation in the EHR. Of note, the above was done during same day of the actual patient encounter. Marshall Cranker fifth presurgical visit. Patient still waiting on her CPAP. Patient does have severe sleep apnea however has been waiting for a long time. To get her CPAP machine. I am very concerned that delaying surgery may have more negative effects than proceeding without the CPAP. Patient does have a recliner so may consider positional therapy. Patient also still needs to try more protein powder. We will see the patient next month for continued follow-up. We discussed how her excess weight affects her overall health and importance of weight loss, healthy diet and active lifestyle to alleviate those co morbid conditions, otherwise risk deterioration. Morbid Obesity: Body mass index is 48.83 kg/m². [x] Continue to make dietary and lifestyle modifications. [x] Plan for Future laparoscopic sleeve gastrectomy. [x] Return for follow-up next month.       Chronic GERD:   [x] Continue to make dietary and lifestyle modifications. [x] Continue Omeprazole. Essential Hypertension:  [x] Continue to make dietary and lifestyle modifications. [x] Reviewed the importance of checking blood pressure. [x] Continue to follow up with their PCP for medication management and monitoring. Diabetes Mellitus Type II in Obese:   [x] Continue to make dietary and lifestyle modifications. [x] Reviewed the importance of checking blood sugars. [x] Continue to follow up with their PCP and/or Endocrinologist for medication management and monitoring. Hyperlipidemia:   [x] Continue to make dietary and lifestyle modifications. [x] Continue to follow up with their PCP for medication management and monitoring. Obstructive Sleep Apnea:   [x] Continue to make dietary and lifestyle modifications. [x] Reviewed the importance of wearing / compliance with CPAP/BIPAP. [x] Continue to follow up with their sleep medicine provider for CPAP/BIPAP management and monitoring. Patient advised that its their responsibility to follow up for studies, referrals and/or labs ordered today.

## 2022-07-14 PROBLEM — Z01.818 PREOPERATIVE CLEARANCE: Status: RESOLVED | Noted: 2020-01-30 | Resolved: 2022-07-14

## 2022-07-26 ENCOUNTER — OFFICE VISIT (OUTPATIENT)
Dept: BARIATRICS/WEIGHT MGMT | Age: 42
End: 2022-07-26
Payer: COMMERCIAL

## 2022-07-26 VITALS
BODY MASS INDEX: 49.5 KG/M2 | RESPIRATION RATE: 18 BRPM | WEIGHT: 269 LBS | DIASTOLIC BLOOD PRESSURE: 72 MMHG | SYSTOLIC BLOOD PRESSURE: 130 MMHG | HEART RATE: 98 BPM | HEIGHT: 62 IN | OXYGEN SATURATION: 98 %

## 2022-07-26 DIAGNOSIS — I10 ESSENTIAL HYPERTENSION: ICD-10-CM

## 2022-07-26 DIAGNOSIS — G47.30 OBSERVED SLEEP APNEA: ICD-10-CM

## 2022-07-26 DIAGNOSIS — E78.2 MIXED HYPERLIPIDEMIA: ICD-10-CM

## 2022-07-26 DIAGNOSIS — K21.9 CHRONIC GERD: ICD-10-CM

## 2022-07-26 DIAGNOSIS — E66.01 MORBID OBESITY WITH BMI OF 50.0-59.9, ADULT (HCC): ICD-10-CM

## 2022-07-26 DIAGNOSIS — E66.9 DIABETES MELLITUS TYPE 2 IN OBESE (HCC): Primary | ICD-10-CM

## 2022-07-26 DIAGNOSIS — E11.69 DIABETES MELLITUS TYPE 2 IN OBESE (HCC): Primary | ICD-10-CM

## 2022-07-26 PROCEDURE — 99214 OFFICE O/P EST MOD 30 MIN: CPT | Performed by: SURGERY

## 2022-07-26 NOTE — PROGRESS NOTES
Tung Jane gained 2 lbs over past month. Is pt eating at least 4 times everyday? yes    Is pt eating a lean protein source with all meals and snacks? No   B - 2 slices of toast with butter   L - Salad with chicken   S - sometimes - KIND portions   D - frozen veggies with grilled chicken * feels hungry after dinner   S - KIND bar     Has pt decreased their portions using the plate method? Yes     Is pt choosing low fat/sugar free options? Yes     Is pt drinking at least 64 oz of clear liquids everyday? Yes     Has pt stopped drinking carbonation, caffeinated, and sugar sweetened beverages? Yes - decaf coffee powdered creamer and splenda, water, vitamin water zero, eliminated soda      Has pt sampled Unjury and/or Nectar protein? Needs to try more flavors, liked the chocolate     Has pt attended a support group? Completed    Participating in intentional exercise? Working two jobs right now. Walking a lot as part of new job.       Plan/Recommendations:   Include protein with breakfast and snacks   Switch KIND bar to protein bar   Decrease portions of bread to 1 slice at breakfast, add in grain/fruit at dinner   Keep trying protein powders to find more flavor options     Handouts: protein bar     Sb Alonzo, CHAY, LD

## 2022-07-26 NOTE — PROGRESS NOTES
[facility]) Physicians   [employer] [facility] [employer]  Eagle Owen MD, [address] [facility], [address] [place] [address]    [address] [id number] . Phone: [phone]  Fax: [phone]        HPI:     Tung Jane is a very pleasant 43 y.o. female with Body mass index is 49.2 kg/m². , Pre-Surgery for future weight loss. Pre-operative clearance and work up done. Working hard to keep good dietary habits as well level of activity. Patient denies any nausea, vomiting, fevers, chills, shortness of breath, chest pain, cough, constipation or difficulty urinating. Pain Assessment   Denies any abdominal pain       Past Medical History:   Diagnosis Date    Abdominal hernia     Anxiety     Chronic GERD 1/30/2020    Depression     Diabetes mellitus type 2 in obese ([facility] [address] [facility] 1/30/2020    Essential hypertension 1/30/2020    Hyperlipidemia     Morbid obesity with BMI of 50.0-59.9, adult ([facility] 1/30/2020    Obstructive sleep apnea 1/30/2020    Thyroid disease      Past Surgical History:   Procedure Laterality Date    UPPER GASTROINTESTINAL ENDOSCOPY N/A 2/11/2022    EGD BIOPSY performed by Bre Daley MD at [address] [place] [address]     Family History   Problem Relation Age of Onset    Hypertension Mother     Stroke Mother     Elevated Lipids Mother     Mental Illness Mother     Hypertension Father     Stroke Father     Elevated Lipids Father     Diabetes Father     Mental Illness Father      Social History     Tobacco Use    Smoking status: Never    Smokeless tobacco: Never   Substance Use Topics    Alcohol use: Not Currently     I counseled the patient on the importance of not smoking and risks of ETOH. Allergies   Allergen Reactions    Amoxicillin      Side effect - yeast infection    Prozac [Fluoxetine Hcl]      Left arm numbness     Vitals:    07/26/22 0817   BP: 130/72   Pulse: 98   Resp: 18   SpO2: 98%   Weight: 269 lb (122 kg)   Height: 5' 2\" (1.575 m)       Body mass index is 49.2 kg/m².       Current Outpatient Medications: LEVEMIR FLEXTOUCH 100 UNIT/ML injection pen, , Disp: , Rfl:     [employer] PEN NEEDLES 31G X 5 MM MISC, , Disp: , Rfl:     lisinopril (PRINIVIL;ZESTRIL) 10 MG tablet, , Disp: , Rfl:     oxybutynin (DITROPAN-XL) 10 MG extended release tablet, , Disp: , Rfl:     omeprazole (PRILOSEC) 20 MG delayed release capsule, Take 1 capsule by mouth Daily, Disp: 30 capsule, Rfl: 3    amphetamine-dextroamphetamine (ADDERALL XR) 10 MG extended release capsule, Take 10 mg by mouth every morning., Disp: , Rfl:     glipiZIDE (GLUCOTROL) 5 MG tablet, Take 5 mg by mouth 2 times daily (before meals), Disp: , Rfl:     metFORMIN (GLUCOPHAGE) 1000 MG tablet, , Disp: , Rfl:     pioglitazone (ACTOS) 30 MG tablet, Take 30 mg by mouth, Disp: , Rfl:     sertraline (ZOLOFT) 100 MG tablet, Take 100 mg by mouth, Disp: , Rfl:     levothyroxine (SYNTHROID) 175 MCG tablet, Take 175 mcg by mouth, Disp: , Rfl:     atorvastatin (LIPITOR) 20 MG tablet, , Disp: , Rfl:     valACYclovir (VALTREX) 500 MG tablet, Take 500 mg by mouth, Disp: , Rfl:     fexofenadine (ALLEGRA) 180 MG tablet, Take 180 mg by mouth, Disp: , Rfl:       Review of Systems - History obtained from the patient  General ROS: negative  Psychological ROS: negative  Ophthalmic ROS: negative  Neurological ROS: negative  ENT ROS: negative  Allergy and Immunology ROS: negative  Hematological and Lymphatic ROS: negative  Endocrine ROS: negative  Breast ROS: negative  Respiratory ROS: negative  Cardiovascular ROS: negative  Gastrointestinal ROS:negative  Genito-Urinary ROS: negative  Musculoskeletal ROS: negative   Skin ROS: negative    Physical Exam   Vitals Reviewed   Constitutional: Patient is oriented to person, place, and time. Patient appears well-developed and well-nourished. Patient is active and cooperative. Non-toxic appearance. No distress. HENT:   Head: Normocephalic and atraumatic. Head is without abrasion and without laceration.  Hair is normal.   Right Ear: External ear normal. No conditions. I encouraged the patient to continue exercise and keeping healthy eating habits. Discussed pre-op labs and work up till now. Also counseled the patient extensively on Surgery. The encounter today, included any number of the following: Bariatric Pre/Post operative work up/protocols, review of labs, imaging, provider notes, outside hospital records, performing examination/evaluation, counseling patient and/or family, ordering medications/tests, placing referrals and communication with referring physicians, coordination of care; discussing dietary plan/recall with the patient as well with registered dietitian and documentation in the EHR. Of note, the above was done during same day of the actual patient encounter. I had a long discussion with the patient today with regards to sleep apnea. Given the worldwide/national shortage of microchips, the previous recall of CPAP machines, and scarce availability of new ones the patient is not able to get any device to treat their sleep apnea. Patient does have severe DAVID. Discussed with the patient that with mild sleep apnea the benefits outweighs the risk to proceed without further treatment. However with moderate to severe sleep apnea the risks are definitely higher. Discussed other treatment options. Unfortunately oral/dental appliance is financially not feasible. Last resort would be positional therapy whether sleeping in a recliner or with head of the bed up more than 30 degrees. Patient is fully aware of the risks and benefits of proceeding. Patient agrees on positional therapy and being compliant with it. Otherwise, the risk of having significant complications like but not limited to; staple line leak, arrhythmias, heart failure, low oxygen saturation, and other complications. Patient wants to proceed with surgery.  Patient also is aware that they will continue to pursue obtaining the CPAP/BiPAP machine once becoming available and start using it for at least 4 hours every night. Most likely the patient will benefit from the gastric sleeve surgery in treating of the obstructive sleep apnea. In 2013, in a study published in Obesity Surgery Journal,  A total of 69 studies with 13,900 patients were included and revealed that all the procedures achieved profound effects on DAVID, as over 75 % of patients saw at least an improvement in their sleep apnea, bariatric surgery is a definitive treatment for obstructive sleep apnea, regardless of the specific type of surgery. We discussed the proportionality between weight and AHI. With 10% weight change, the AHI has a 27% proportionate change. With 20% weight change, the AHI has a 45-50% proportionate change. Maritza Paz is a 43 y.o. female with Body mass index is 49.2 kg/m². ,  patient is deemed appropriate candidate for weight loss surgery by our multidisciplinary team.       Following The Metabolic and Bariatric Surgery Accreditation and Quality Improvement Program [facility]), and [employer] (ACS) recommendations, the Bariatric Surgical Risk/Benefit Calculator was used for Maritza Paz. Report was discussed with Sofia Patel and patient wishes to proceed with surgery, fully understanding the risks and benefits. Of note, The [facility] Bariatric Surgical Risk/Benefit Calculator estimates the chance of an unfavorable outcome (such as a complication or death), the chance of remission of weight-related comorbidities, and the patient's BMI, weight change, and percent total weight change after surgery. These quantities are estimated based upon information the patient gives the healthcare provider about prior health history. The estimates are calculated using data from a large number of patients who had a primary bariatric surgical procedure similar to the one the patient may have.   Please note the risk percentages, remission percentages, BMI, weight change, and percent total weight change provided to you by the risk/benefit calculator are only estimates. These estimates only take certain information into account. There may be other factors that are not included in the estimate which may increase or decrease the risk of a complication, the chance of remission of a weight-related comorbidity, or the amount of weight the patient loses. These estimates are not a guarantee of results. A complication after surgery may happen even if the risk is low, a weight-related comorbidity may not go into remission even if the chances are high, and a patient may lose more or less weight than predicted. This information is not intended to replace the advice of a doctor or healthcare provider about the diagnosis, treatment, or potential outcomes. The Provider is not responsible for medical decisions that may be made by the patient based on the risk/benefit calculator estimates, since these estimates are provided for informational purposes. Patients should always consult their doctor or other health care provider before deciding on a treatment plan. Both open and laparoscopic approach were explained in details. Benefits and risks explained. Informed consent obtained. Risks including but not limited to; Infection, bleeding, gastric leak or obstruction, persistent nausea, vomiting, or reflux, injury to surrounding structures, risks of anesthesia, stricture, delayed gastric emptying, staple line leak, incisional hernia, malnutrition , hair loss, and/ or Conversion to Open surgery may be necessary. Failure to lose or maintain weight loss, Gallstones or Kidney Stones, Deep Venous Thrombosis , pulmonary embolism and / or death.      With obesity and/or Fatty liver , I may elect to perform liver core biopsy to have  Baseline idea on liver pathology if there is abnormal Liver Functions or if there is hepatomegaly &/or lesion, risks include but not limited to bile leak, liver hematoma or failure to diagnose a condition. I did explain thoroughly to the patient that compliance with pre- and post op diet and other recommendations are integral part to improve the chances of successful weight loss and also not following it could end with serious health complications. We discussed that our goal is to ameliorate the medical problems and not to obtain a specific body mass index. Patient understands the risks and benefits and wishes to proceed with the procedure. Also understands if BMI is lower than 40 without significant co morbid conditions, concerns for risks of surgery being somewhat higher over long run, however patient wants to proceed and fully understands the risks. Clearly BMI over 35 does impose very serious health risks as well chances of losing weight on diet only is very limited and sustaining weight loss is even less, thus surgery is certainly recommended for long term weight loss and better health overall given compliance. Obesity as a disease is considered a high risk to patients overall health and should therefore be considered a high risk disease state. Now with Covid-19 pandemic, CDC and health authorities does classify obese patients as vulnerable and high risk as well. Which makes weight loss a priority for improvement of their wellbeing and overall health. I advised the patient that we can't guarantee final insurance approval.        I advised the patient that sometimes other indicated procedures may arise and the decision will be based on my assessment intraoperatively. Some may include include but not limited to:  [Ventral Hernia, Risks include but not limited to; infection, bleeding, injury to organs or nerves or vessels, PE, DVT, cardiac events, , persistent pain or symptoms, abscess, hernia recurrence or need for further procedures.  However that will be determined intra-operatively, if not safe to proceed , then any additional procedures will have to be addressed later as primary goal is bariatric surgery.]      [ Hiatal Hernia, will attempt repair,  risks and benefits including but not limited to; hemothorax, pneumothorax, recurrence, difficulty swallowing, persistent symptoms, reflux and need for medications, esophageal, splenic, lung, heart, bowel, vagus nerve or gastric injuries. However that will be determined intra-operatively, if not safe to proceed, then any additional procedures will have to be addressed later as primary goal is bariatric surgery.]     Yasmeen Johns understands that there may be a need to perform other urgent or necessary procedures that were unanticipated. Zoe Macy consent to the performance of any additional procedures determined during the original procedure to be in their best interests and where delay might cause additional harm or put patient at risk for additional anesthesia risk for required by a second procedure and that will be determined by the surgeon. Zoe Cavazos is aware if Weight gain occurs in pre-op period while on pre-operative diet or  non compliant with it , surgery will be canceled. Yasmeen Johns understand that in relation to the COVID-19 pandemic and surgical procedures, patient have been given the opportunity to postpone   surgery until a  later date. Zoe Cavazos have chosen to proceed with surgery knowing the risks associated with COVID-19. Yasmeen Johns was satisfied with the discussion we had and Yasmeen Mcwilliamspari had no further questions at end of visit and wants to proceed with surgery. 1- Pre-operative work up ordered for you(labs/x-rays/EKG). 2- Stop taking Blood thinners,one week before surgery. 3- F/U with PCP for pre-op Medical Clearance. 4- Plan for Laparoscopic Sleeve, possible other indicated procedures. Morbid Obesity: Body mass index is 49.2 kg/m². [x] Continue to make dietary and lifestyle modifications. [x] Plan for Future laparoscopic sleeve gastrectomy.   [x] Return for follow-up next month.      Chronic GERD:   [x] Continue to make dietary and lifestyle modifications. [] Continue Omeprazole. [] Continue Famotidine. [x] Weight loss Recommended. Essential Hypertension:  [x] Continue to make dietary and lifestyle modifications. [x] Reviewed the importance of checking blood pressure. [x] Continue to follow up with their PCP for medication management and monitoring. [x] Weight loss Recommended. Diabetes Mellitus Type II in Obese:   [x] Continue to make dietary and lifestyle modifications. [x] Reviewed the importance of checking blood sugars. [x] Continue to follow up with their PCP and/or Endocrinologist for medication management and monitoring. [x] Weight loss Recommended. Hyperlipidemia:   [x] Continue to make dietary and lifestyle modifications. [x] Continue to follow up with their PCP for medication management and monitoring. [x] Weight loss Recommended. Obstructive Sleep Apnea:   [x] Continue to make dietary and lifestyle modifications. [x] Reviewed the importance of wearing / compliance with CPAP/BIPAP or positional therapy. [x] Continue to follow up with their sleep medicine provider for CPAP/BIPAP management and monitoring. [x] Weight loss Recommended. Please note that some or all of this report was generated using voice recognition software. Please notify me in case of any questions about the content of this document, as some errors in transcription may have occurred .

## 2022-07-27 ASSESSMENT — ENCOUNTER SYMPTOMS
SHORTNESS OF BREATH: 0
RESPIRATORY NEGATIVE: 1
COUGH: 0
ALLERGIC/IMMUNOLOGIC NEGATIVE: 1
GASTROINTESTINAL NEGATIVE: 1
EYES NEGATIVE: 1

## 2022-07-27 NOTE — PROGRESS NOTES
12/10/2021 09:13 AM    MONOSABS 0.50 12/10/2021 09:13 AM    EOSABS 0.20 12/10/2021 09:13 AM     Lab Results   Component Value Date/Time     12/10/2021 09:13 AM    K 3.9 12/10/2021 09:13 AM     12/10/2021 09:13 AM    CO2 30 12/10/2021 09:13 AM    ANIONGAP 4 12/10/2021 09:13 AM    GLUCOSE 94 12/10/2021 09:13 AM    BUN 16 12/10/2021 09:13 AM    CREATININE 0.55 12/10/2021 09:13 AM    LABGLOM >60 01/31/2020 08:06 AM    GFRAA >60 01/31/2020 08:06 AM    CALCIUM 9.0 12/10/2021 09:13 AM    PROT 7.1 12/10/2021 09:13 AM    LABALBU 3.9 12/10/2021 09:13 AM    AGRATIO 1.3 01/31/2020 08:06 AM    BILITOT 0.5 12/10/2021 09:13 AM    ALKPHOS 67 12/10/2021 09:13 AM    ALT 18 12/10/2021 09:13 AM    AST 18 12/10/2021 09:13 AM    GLOB 2.9 01/31/2020 08:06 AM     Lab Results   Component Value Date/Time    CHOL 110 12/10/2021 09:13 AM    TRIG 96 12/10/2021 09:13 AM    HDL 41 12/10/2021 09:13 AM    LDLCALC 50 12/10/2021 09:13 AM    LABVLDL 26 01/31/2020 08:06 AM     Lab Results   Component Value Date/Time    TSHREFLEX 17.12 01/31/2020 08:06 AM     Lab Results   Component Value Date/Time    IRON 70 12/10/2021 09:13 AM    TIBC 332 12/10/2021 09:13 AM    LABIRON 21 12/10/2021 09:13 AM     Lab Results   Component Value Date/Time    WTFCYZFD63 516 12/10/2021 09:13 AM    FOLATE 11.16 01/31/2020 08:06 AM     Lab Results   Component Value Date/Time    VITD25 43.1 12/10/2021 09:13 AM     Lab Results   Component Value Date/Time    LABA1C 8.4 12/10/2021 09:13 AM     12/10/2021 09:13 AM       Review of Systems   Constitutional: Negative. Negative for chills, fatigue and fever. HENT: Negative. Eyes: Negative. Respiratory: Negative. Negative for cough and shortness of breath. Cardiovascular: Negative. Gastrointestinal: Negative. Endocrine: Negative. Genitourinary: Negative. Musculoskeletal: Negative. Skin: Negative. Allergic/Immunologic: Negative. Neurological: Negative. Hematological: Negative. Psychiatric/Behavioral: Negative. Objective:     Physical Exam  Vitals reviewed. Constitutional:       Appearance: Normal appearance. She is well-developed. She is obese. HENT:      Head: Normocephalic and atraumatic. Eyes:      Conjunctiva/sclera: Conjunctivae normal.      Pupils: Pupils are equal, round, and reactive to light. Pulmonary:      Effort: Pulmonary effort is normal.   Abdominal:      Palpations: Abdomen is soft. Musculoskeletal:         General: Normal range of motion. Cervical back: Normal range of motion and neck supple. Skin:     General: Skin is warm and dry. Neurological:      Mental Status: She is alert and oriented to person, place, and time. Psychiatric:         Mood and Affect: Mood normal.         Behavior: Behavior normal.         Thought Content: Thought content normal.         Judgment: Judgment normal.     Assessment and Plan:   Patient is here for their preoperative education group visit for sleeve gastrectomy. The patient is down 3 lbs today. The patient's current Body mass index is 48.65 kg/m². (8/9/22). She is making good dietary and behavior modifications and is considered to be a good surgical candidate. Patient has a diagnosis of hypertension. Reviewed the importance of checking blood pressure preoperatively and postoperatively. In addition, following up with their PCP for medication adjustments as needed. Discussed the fact that they will be required to crush or open their medications for the first two weeks after surgery and reviewed those medications that can not be crushed. Patient has a diagnosis of diabetes. Reviewed the importance of checking blood sugars preoperatively and postoperatively. In addition, following up with their PCP or endocrinologist for medication adjustments as needed.  Discussed the fact that they will be required to crush or open their medications for the first two weeks after surgery and reviewed those medications that can not be crushed. Patient has a diagnosis of hyperlipidemia. Discussed the benefits of weight loss and dietary changes on lipids and cholesterol. Discussed the fact that they will be required to crush or open their medications for the first two weeks after surgery and reviewed those medications that can not be crushed. Patient has a diagnosis of obstructive sleep apnea and uses a CPAP for sleep. Discussed that weight loss can assist with improving sleep apnea symptoms. Explained to patient to make sure they bring their CPAP with them to the hospital for their surgery. Patient has a diagnosis of chronic GERD and takes a PPI. Discussed the benefits of weight loss and dietary changes on acid reflux. However, they will most likely need to continue their medication short term after surgery as they adjust to the new diet. Discussed the fact that they will be required to crush or open their medications for the first two weeks after surgery and reviewed those medications that can not be crushed. Patient received instruction that it is recommended to avoid pregnancy following bariatric surgery for at least 2 years to allow them to have stable weight loss and to help avoid increased risk of vitamin deficiencies and malnutrition. The patient was encouraged to discuss possible contraceptive methods with their PCP or OBGYN. Patient received instructions from the registered dietitian in reference to the two week preoperative diet and the four phases of their postoperative diet. In addition I reviewed these instructions and stressed the importance of following these recommendations for their safety.      Patient completed the preoperative class where they were provided with education related to their bariatric surgery, common surgical complications, medications preoperatively & postoperatively, special concerns related to bariatric surgery postoperatively, vitamin supplementation, patient agreement, PAT & scheduling, hospital course, wellness discovery program and what to do in the case of an emergency postoperatively. The dietitians reviewed all preoperative and postoperative diet instructions. Patient was given the opportunity to ask questions during the group visit and these questions were answered by myself and/or the dietitian. I spent a total of 45 minutes on the day of the visit and over half of that time was spent counseling the patient on proper dietary behaviors, exercise and surgery protocols.

## 2022-08-03 NOTE — PROGRESS NOTES
Patient Name:   Sherron Goldberg        Type of Surgery:  Sleeve         Date of Surgery:  8/29/22        Start Pre-Op Diet On:  8/16/22        Start Clear Liquids On:  8/28/22          NPO after midnight the night before your surgery! Take morning medications with a small amount of water.     NOTES:_______________________________________  ______________________________________________

## 2022-08-09 ENCOUNTER — TELEPHONE (OUTPATIENT)
Dept: BARIATRICS/WEIGHT MGMT | Age: 42
End: 2022-08-09

## 2022-08-09 ENCOUNTER — OFFICE VISIT (OUTPATIENT)
Dept: BARIATRICS/WEIGHT MGMT | Age: 42
End: 2022-08-09
Payer: COMMERCIAL

## 2022-08-09 VITALS — WEIGHT: 266 LBS | BODY MASS INDEX: 48.95 KG/M2 | HEIGHT: 62 IN

## 2022-08-09 DIAGNOSIS — E78.2 MIXED HYPERLIPIDEMIA: ICD-10-CM

## 2022-08-09 DIAGNOSIS — E66.01 MORBID OBESITY WITH BMI OF 45.0-49.9, ADULT (HCC): ICD-10-CM

## 2022-08-09 DIAGNOSIS — E11.69 DIABETES MELLITUS TYPE 2 IN OBESE (HCC): Primary | ICD-10-CM

## 2022-08-09 DIAGNOSIS — G47.30 OBSERVED SLEEP APNEA: ICD-10-CM

## 2022-08-09 DIAGNOSIS — K21.9 CHRONIC GERD: ICD-10-CM

## 2022-08-09 DIAGNOSIS — E66.9 DIABETES MELLITUS TYPE 2 IN OBESE (HCC): Primary | ICD-10-CM

## 2022-08-09 DIAGNOSIS — I10 ESSENTIAL HYPERTENSION: ICD-10-CM

## 2022-08-09 PROCEDURE — 99214 OFFICE O/P EST MOD 30 MIN: CPT | Performed by: NURSE PRACTITIONER

## 2022-08-18 ENCOUNTER — TELEPHONE (OUTPATIENT)
Dept: BARIATRICS/WEIGHT MGMT | Age: 42
End: 2022-08-18

## 2022-08-18 NOTE — TELEPHONE ENCOUNTER
Pt wanted to know if she could consume    bean soup, dried black beans with water, garlic and spices     Pt is on preop diet

## 2022-08-29 ENCOUNTER — HOSPITAL ENCOUNTER (INPATIENT)
Age: 42
LOS: 1 days | Discharge: HOME | DRG: 621 | End: 2022-08-30
Attending: SURGERY | Admitting: SURGERY
Payer: COMMERCIAL

## 2022-08-29 ENCOUNTER — ANESTHESIA EVENT (OUTPATIENT)
Dept: OPERATING ROOM | Age: 42
DRG: 621 | End: 2022-08-29
Payer: COMMERCIAL

## 2022-08-29 ENCOUNTER — ANESTHESIA (OUTPATIENT)
Dept: OPERATING ROOM | Age: 42
DRG: 621 | End: 2022-08-29
Payer: COMMERCIAL

## 2022-08-29 DIAGNOSIS — Z01.818 PREOP TESTING: Primary | ICD-10-CM

## 2022-08-29 DIAGNOSIS — K43.9 VENTRAL HERNIA WITHOUT OBSTRUCTION OR GANGRENE: ICD-10-CM

## 2022-08-29 DIAGNOSIS — Z98.84 S/P LAPAROSCOPIC SLEEVE GASTRECTOMY: ICD-10-CM

## 2022-08-29 DIAGNOSIS — E66.01 MORBID OBESITY (HCC): ICD-10-CM

## 2022-08-29 LAB
ABO/RH: NORMAL
ANTIBODY SCREEN: NORMAL
GLUCOSE BLD-MCNC: 83 MG/DL (ref 70–99)
HCG(URINE) PREGNANCY TEST: NEGATIVE
PERFORMED ON: NORMAL

## 2022-08-29 PROCEDURE — 6370000000 HC RX 637 (ALT 250 FOR IP): Performed by: SURGERY

## 2022-08-29 PROCEDURE — 88307 TISSUE EXAM BY PATHOLOGIST: CPT

## 2022-08-29 PROCEDURE — 0WQF4ZZ REPAIR ABDOMINAL WALL, PERCUTANEOUS ENDOSCOPIC APPROACH: ICD-10-PCS | Performed by: SURGERY

## 2022-08-29 PROCEDURE — A4217 STERILE WATER/SALINE, 500 ML: HCPCS | Performed by: SURGERY

## 2022-08-29 PROCEDURE — 94150 VITAL CAPACITY TEST: CPT

## 2022-08-29 PROCEDURE — 6360000002 HC RX W HCPCS: Performed by: SURGERY

## 2022-08-29 PROCEDURE — 43775 LAP SLEEVE GASTRECTOMY: CPT | Performed by: SURGERY

## 2022-08-29 PROCEDURE — 3700000000 HC ANESTHESIA ATTENDED CARE: Performed by: SURGERY

## 2022-08-29 PROCEDURE — 6370000000 HC RX 637 (ALT 250 FOR IP)

## 2022-08-29 PROCEDURE — P9045 ALBUMIN (HUMAN), 5%, 250 ML: HCPCS | Performed by: NURSE ANESTHETIST, CERTIFIED REGISTERED

## 2022-08-29 PROCEDURE — 2500000003 HC RX 250 WO HCPCS: Performed by: SURGERY

## 2022-08-29 PROCEDURE — 6360000002 HC RX W HCPCS: Performed by: ANESTHESIOLOGY

## 2022-08-29 PROCEDURE — 86900 BLOOD TYPING SEROLOGIC ABO: CPT

## 2022-08-29 PROCEDURE — 7100000001 HC PACU RECOVERY - ADDTL 15 MIN: Performed by: SURGERY

## 2022-08-29 PROCEDURE — C9113 INJ PANTOPRAZOLE SODIUM, VIA: HCPCS | Performed by: SURGERY

## 2022-08-29 PROCEDURE — 2500000003 HC RX 250 WO HCPCS: Performed by: NURSE ANESTHETIST, CERTIFIED REGISTERED

## 2022-08-29 PROCEDURE — 36415 COLL VENOUS BLD VENIPUNCTURE: CPT

## 2022-08-29 PROCEDURE — 2580000003 HC RX 258: Performed by: SURGERY

## 2022-08-29 PROCEDURE — 7100000000 HC PACU RECOVERY - FIRST 15 MIN: Performed by: SURGERY

## 2022-08-29 PROCEDURE — 2720000010 HC SURG SUPPLY STERILE: Performed by: SURGERY

## 2022-08-29 PROCEDURE — 86901 BLOOD TYPING SEROLOGIC RH(D): CPT

## 2022-08-29 PROCEDURE — 1200000000 HC SEMI PRIVATE

## 2022-08-29 PROCEDURE — 3600000015 HC SURGERY LEVEL 5 ADDTL 15MIN: Performed by: SURGERY

## 2022-08-29 PROCEDURE — 49652 PR LAP, VENTRAL HERNIA REPAIR,REDUCIBLE: CPT | Performed by: SURGERY

## 2022-08-29 PROCEDURE — 3700000001 HC ADD 15 MINUTES (ANESTHESIA): Performed by: SURGERY

## 2022-08-29 PROCEDURE — 84703 CHORIONIC GONADOTROPIN ASSAY: CPT

## 2022-08-29 PROCEDURE — 3600000005 HC SURGERY LEVEL 5 BASE: Performed by: SURGERY

## 2022-08-29 PROCEDURE — 2700000000 HC OXYGEN THERAPY PER DAY

## 2022-08-29 PROCEDURE — 6360000002 HC RX W HCPCS: Performed by: NURSE ANESTHETIST, CERTIFIED REGISTERED

## 2022-08-29 PROCEDURE — 0DB64Z3 EXCISION OF STOMACH, PERCUTANEOUS ENDOSCOPIC APPROACH, VERTICAL: ICD-10-PCS | Performed by: SURGERY

## 2022-08-29 PROCEDURE — 2709999900 HC NON-CHARGEABLE SUPPLY: Performed by: SURGERY

## 2022-08-29 PROCEDURE — A4216 STERILE WATER/SALINE, 10 ML: HCPCS | Performed by: SURGERY

## 2022-08-29 PROCEDURE — 86850 RBC ANTIBODY SCREEN: CPT

## 2022-08-29 RX ORDER — PROMETHAZINE HYDROCHLORIDE 25 MG/ML
6.25 INJECTION, SOLUTION INTRAMUSCULAR; INTRAVENOUS EVERY 6 HOURS PRN
Status: DISCONTINUED | OUTPATIENT
Start: 2022-08-29 | End: 2022-08-30 | Stop reason: HOSPADM

## 2022-08-29 RX ORDER — METHYLENE BLUE 10 MG/ML
INJECTION INTRAVENOUS
Status: COMPLETED | OUTPATIENT
Start: 2022-08-29 | End: 2022-08-29

## 2022-08-29 RX ORDER — LIDOCAINE HYDROCHLORIDE 10 MG/ML
0.5 INJECTION, SOLUTION EPIDURAL; INFILTRATION; INTRACAUDAL; PERINEURAL ONCE
Status: DISCONTINUED | OUTPATIENT
Start: 2022-08-29 | End: 2022-08-29 | Stop reason: HOSPADM

## 2022-08-29 RX ORDER — DEXAMETHASONE SODIUM PHOSPHATE 4 MG/ML
INJECTION, SOLUTION INTRA-ARTICULAR; INTRALESIONAL; INTRAMUSCULAR; INTRAVENOUS; SOFT TISSUE PRN
Status: DISCONTINUED | OUTPATIENT
Start: 2022-08-29 | End: 2022-08-29 | Stop reason: SDUPTHER

## 2022-08-29 RX ORDER — OXYCODONE HYDROCHLORIDE 5 MG/1
10 TABLET ORAL PRN
Status: DISCONTINUED | OUTPATIENT
Start: 2022-08-29 | End: 2022-08-29 | Stop reason: HOSPADM

## 2022-08-29 RX ORDER — HYDRALAZINE HYDROCHLORIDE 20 MG/ML
10 INJECTION INTRAMUSCULAR; INTRAVENOUS
Status: DISCONTINUED | OUTPATIENT
Start: 2022-08-29 | End: 2022-08-30 | Stop reason: HOSPADM

## 2022-08-29 RX ORDER — LABETALOL HYDROCHLORIDE 5 MG/ML
10 INJECTION, SOLUTION INTRAVENOUS
Status: DISCONTINUED | OUTPATIENT
Start: 2022-08-29 | End: 2022-08-29 | Stop reason: HOSPADM

## 2022-08-29 RX ORDER — LIDOCAINE HYDROCHLORIDE 20 MG/ML
INJECTION, SOLUTION EPIDURAL; INFILTRATION; INTRACAUDAL; PERINEURAL PRN
Status: DISCONTINUED | OUTPATIENT
Start: 2022-08-29 | End: 2022-08-29 | Stop reason: SDUPTHER

## 2022-08-29 RX ORDER — HYOSCYAMINE SULFATE 0.5 MG/ML
250 INJECTION, SOLUTION SUBCUTANEOUS EVERY 4 HOURS PRN
Status: DISCONTINUED | OUTPATIENT
Start: 2022-08-29 | End: 2022-08-30 | Stop reason: HOSPADM

## 2022-08-29 RX ORDER — SODIUM CHLORIDE 0.9 % (FLUSH) 0.9 %
5-40 SYRINGE (ML) INJECTION PRN
Status: DISCONTINUED | OUTPATIENT
Start: 2022-08-29 | End: 2022-08-30 | Stop reason: HOSPADM

## 2022-08-29 RX ORDER — METOCLOPRAMIDE HYDROCHLORIDE 5 MG/ML
10 INJECTION INTRAMUSCULAR; INTRAVENOUS EVERY 6 HOURS PRN
Status: DISCONTINUED | OUTPATIENT
Start: 2022-08-29 | End: 2022-08-30 | Stop reason: HOSPADM

## 2022-08-29 RX ORDER — CIPROFLOXACIN 2 MG/ML
400 INJECTION, SOLUTION INTRAVENOUS
Status: COMPLETED | OUTPATIENT
Start: 2022-08-29 | End: 2022-08-29

## 2022-08-29 RX ORDER — ENOXAPARIN SODIUM 100 MG/ML
30 INJECTION SUBCUTANEOUS ONCE
Status: COMPLETED | OUTPATIENT
Start: 2022-08-29 | End: 2022-08-29

## 2022-08-29 RX ORDER — ACETAMINOPHEN 160 MG/5ML
650 SOLUTION ORAL ONCE
Status: COMPLETED | OUTPATIENT
Start: 2022-08-29 | End: 2022-08-29

## 2022-08-29 RX ORDER — SODIUM CHLORIDE 0.9 % (FLUSH) 0.9 %
5-40 SYRINGE (ML) INJECTION EVERY 12 HOURS SCHEDULED
Status: DISCONTINUED | OUTPATIENT
Start: 2022-08-29 | End: 2022-08-30 | Stop reason: HOSPADM

## 2022-08-29 RX ORDER — DIPHENHYDRAMINE HYDROCHLORIDE 50 MG/ML
12.5 INJECTION INTRAMUSCULAR; INTRAVENOUS
Status: DISCONTINUED | OUTPATIENT
Start: 2022-08-29 | End: 2022-08-29 | Stop reason: HOSPADM

## 2022-08-29 RX ORDER — ENOXAPARIN SODIUM 100 MG/ML
30 INJECTION SUBCUTANEOUS EVERY 12 HOURS SCHEDULED
Status: DISCONTINUED | OUTPATIENT
Start: 2022-08-30 | End: 2022-08-30 | Stop reason: HOSPADM

## 2022-08-29 RX ORDER — APREPITANT 80 MG/1
80 CAPSULE ORAL ONCE
Status: COMPLETED | OUTPATIENT
Start: 2022-08-29 | End: 2022-08-29

## 2022-08-29 RX ORDER — FENTANYL CITRATE 50 UG/ML
25 INJECTION, SOLUTION INTRAMUSCULAR; INTRAVENOUS EVERY 5 MIN PRN
Status: DISCONTINUED | OUTPATIENT
Start: 2022-08-29 | End: 2022-08-29 | Stop reason: HOSPADM

## 2022-08-29 RX ORDER — LIDOCAINE 4 G/G
1 PATCH TOPICAL DAILY
Status: DISCONTINUED | OUTPATIENT
Start: 2022-08-29 | End: 2022-08-30 | Stop reason: HOSPADM

## 2022-08-29 RX ORDER — SODIUM CHLORIDE 9 MG/ML
INJECTION, SOLUTION INTRAVENOUS CONTINUOUS
Status: DISCONTINUED | OUTPATIENT
Start: 2022-08-29 | End: 2022-08-29 | Stop reason: HOSPADM

## 2022-08-29 RX ORDER — HYDROMORPHONE HYDROCHLORIDE 1 MG/ML
0.5 INJECTION, SOLUTION INTRAMUSCULAR; INTRAVENOUS; SUBCUTANEOUS
Status: DISCONTINUED | OUTPATIENT
Start: 2022-08-29 | End: 2022-08-30 | Stop reason: HOSPADM

## 2022-08-29 RX ORDER — ONDANSETRON 2 MG/ML
4 INJECTION INTRAMUSCULAR; INTRAVENOUS EVERY 6 HOURS PRN
Status: DISCONTINUED | OUTPATIENT
Start: 2022-08-29 | End: 2022-08-30 | Stop reason: HOSPADM

## 2022-08-29 RX ORDER — MIDAZOLAM HYDROCHLORIDE 2 MG/2ML
2 INJECTION, SOLUTION INTRAMUSCULAR; INTRAVENOUS
Status: DISCONTINUED | OUTPATIENT
Start: 2022-08-29 | End: 2022-08-29 | Stop reason: HOSPADM

## 2022-08-29 RX ORDER — PROPOFOL 10 MG/ML
INJECTION, EMULSION INTRAVENOUS PRN
Status: DISCONTINUED | OUTPATIENT
Start: 2022-08-29 | End: 2022-08-29 | Stop reason: SDUPTHER

## 2022-08-29 RX ORDER — MAGNESIUM SULFATE HEPTAHYDRATE 500 MG/ML
INJECTION, SOLUTION INTRAMUSCULAR; INTRAVENOUS PRN
Status: DISCONTINUED | OUTPATIENT
Start: 2022-08-29 | End: 2022-08-29 | Stop reason: SDUPTHER

## 2022-08-29 RX ORDER — FENTANYL CITRATE 50 UG/ML
INJECTION, SOLUTION INTRAMUSCULAR; INTRAVENOUS PRN
Status: DISCONTINUED | OUTPATIENT
Start: 2022-08-29 | End: 2022-08-29 | Stop reason: SDUPTHER

## 2022-08-29 RX ORDER — ALBUMIN, HUMAN INJ 5% 5 %
SOLUTION INTRAVENOUS PRN
Status: DISCONTINUED | OUTPATIENT
Start: 2022-08-29 | End: 2022-08-29 | Stop reason: SDUPTHER

## 2022-08-29 RX ORDER — KETAMINE HCL IN NACL, ISO-OSM 100MG/10ML
SYRINGE (ML) INJECTION PRN
Status: DISCONTINUED | OUTPATIENT
Start: 2022-08-29 | End: 2022-08-29 | Stop reason: SDUPTHER

## 2022-08-29 RX ORDER — DIPHENHYDRAMINE HYDROCHLORIDE 50 MG/ML
12.5 INJECTION INTRAMUSCULAR; INTRAVENOUS EVERY 6 HOURS PRN
Status: DISCONTINUED | OUTPATIENT
Start: 2022-08-29 | End: 2022-08-30 | Stop reason: HOSPADM

## 2022-08-29 RX ORDER — OXYCODONE HYDROCHLORIDE 5 MG/1
5 TABLET ORAL PRN
Status: DISCONTINUED | OUTPATIENT
Start: 2022-08-29 | End: 2022-08-29 | Stop reason: HOSPADM

## 2022-08-29 RX ORDER — SODIUM CHLORIDE 9 MG/ML
INJECTION, SOLUTION INTRAVENOUS PRN
Status: DISCONTINUED | OUTPATIENT
Start: 2022-08-29 | End: 2022-08-30 | Stop reason: HOSPADM

## 2022-08-29 RX ORDER — ONDANSETRON 2 MG/ML
INJECTION INTRAMUSCULAR; INTRAVENOUS PRN
Status: DISCONTINUED | OUTPATIENT
Start: 2022-08-29 | End: 2022-08-29 | Stop reason: SDUPTHER

## 2022-08-29 RX ORDER — NALOXONE HYDROCHLORIDE 0.4 MG/ML
INJECTION, SOLUTION INTRAMUSCULAR; INTRAVENOUS; SUBCUTANEOUS PRN
Status: DISCONTINUED | OUTPATIENT
Start: 2022-08-29 | End: 2022-08-30

## 2022-08-29 RX ORDER — GLYCOPYRROLATE 0.2 MG/ML
INJECTION INTRAMUSCULAR; INTRAVENOUS PRN
Status: DISCONTINUED | OUTPATIENT
Start: 2022-08-29 | End: 2022-08-29 | Stop reason: SDUPTHER

## 2022-08-29 RX ORDER — ACETAMINOPHEN 160 MG/5ML
SOLUTION ORAL
Status: COMPLETED
Start: 2022-08-29 | End: 2022-08-29

## 2022-08-29 RX ORDER — SODIUM CHLORIDE, SODIUM LACTATE, POTASSIUM CHLORIDE, CALCIUM CHLORIDE 600; 310; 30; 20 MG/100ML; MG/100ML; MG/100ML; MG/100ML
INJECTION, SOLUTION INTRAVENOUS CONTINUOUS
Status: DISCONTINUED | OUTPATIENT
Start: 2022-08-29 | End: 2022-08-30 | Stop reason: HOSPADM

## 2022-08-29 RX ORDER — MAGNESIUM HYDROXIDE 1200 MG/15ML
LIQUID ORAL CONTINUOUS PRN
Status: COMPLETED | OUTPATIENT
Start: 2022-08-29 | End: 2022-08-29

## 2022-08-29 RX ORDER — FENTANYL CITRATE 50 UG/ML
50 INJECTION, SOLUTION INTRAMUSCULAR; INTRAVENOUS EVERY 5 MIN PRN
Status: DISCONTINUED | OUTPATIENT
Start: 2022-08-29 | End: 2022-08-29 | Stop reason: HOSPADM

## 2022-08-29 RX ORDER — VECURONIUM BROMIDE 1 MG/ML
INJECTION, POWDER, LYOPHILIZED, FOR SOLUTION INTRAVENOUS PRN
Status: DISCONTINUED | OUTPATIENT
Start: 2022-08-29 | End: 2022-08-29 | Stop reason: SDUPTHER

## 2022-08-29 RX ORDER — SCOLOPAMINE TRANSDERMAL SYSTEM 1 MG/1
1 PATCH, EXTENDED RELEASE TRANSDERMAL ONCE
Status: DISCONTINUED | OUTPATIENT
Start: 2022-08-29 | End: 2022-08-29

## 2022-08-29 RX ORDER — LABETALOL HYDROCHLORIDE 5 MG/ML
10 INJECTION, SOLUTION INTRAVENOUS
Status: DISCONTINUED | OUTPATIENT
Start: 2022-08-29 | End: 2022-08-30 | Stop reason: HOSPADM

## 2022-08-29 RX ORDER — ONDANSETRON 2 MG/ML
4 INJECTION INTRAMUSCULAR; INTRAVENOUS
Status: DISCONTINUED | OUTPATIENT
Start: 2022-08-29 | End: 2022-08-29 | Stop reason: HOSPADM

## 2022-08-29 RX ORDER — BUPIVACAINE HYDROCHLORIDE AND EPINEPHRINE 2.5; 5 MG/ML; UG/ML
INJECTION, SOLUTION EPIDURAL; INFILTRATION; INTRACAUDAL; PERINEURAL
Status: COMPLETED | OUTPATIENT
Start: 2022-08-29 | End: 2022-08-29

## 2022-08-29 RX ORDER — HYDRALAZINE HYDROCHLORIDE 20 MG/ML
10 INJECTION INTRAMUSCULAR; INTRAVENOUS
Status: DISCONTINUED | OUTPATIENT
Start: 2022-08-29 | End: 2022-08-29 | Stop reason: HOSPADM

## 2022-08-29 RX ORDER — SCOLOPAMINE TRANSDERMAL SYSTEM 1 MG/1
1 PATCH, EXTENDED RELEASE TRANSDERMAL
Status: DISCONTINUED | OUTPATIENT
Start: 2022-09-01 | End: 2022-08-30 | Stop reason: HOSPADM

## 2022-08-29 RX ORDER — CLINDAMYCIN PHOSPHATE 900 MG/50ML
900 INJECTION INTRAVENOUS
Status: COMPLETED | OUTPATIENT
Start: 2022-08-29 | End: 2022-08-29

## 2022-08-29 RX ORDER — SUCCINYLCHOLINE/SOD CL,ISO/PF 200MG/10ML
SYRINGE (ML) INTRAVENOUS PRN
Status: DISCONTINUED | OUTPATIENT
Start: 2022-08-29 | End: 2022-08-29 | Stop reason: SDUPTHER

## 2022-08-29 RX ORDER — HALOPERIDOL 5 MG/ML
1 INJECTION INTRAMUSCULAR
Status: DISCONTINUED | OUTPATIENT
Start: 2022-08-29 | End: 2022-08-29 | Stop reason: HOSPADM

## 2022-08-29 RX ADMIN — ONDANSETRON 4 MG: 2 INJECTION INTRAMUSCULAR; INTRAVENOUS at 13:44

## 2022-08-29 RX ADMIN — Medication 30 MG: at 12:50

## 2022-08-29 RX ADMIN — PHENYLEPHRINE HYDROCHLORIDE 100 MCG: 10 INJECTION INTRAVENOUS at 13:10

## 2022-08-29 RX ADMIN — APREPITANT 80 MG: 80 CAPSULE ORAL at 09:09

## 2022-08-29 RX ADMIN — Medication 20 MG: at 13:56

## 2022-08-29 RX ADMIN — SODIUM CHLORIDE, POTASSIUM CHLORIDE, SODIUM LACTATE AND CALCIUM CHLORIDE: 600; 310; 30; 20 INJECTION, SOLUTION INTRAVENOUS at 17:04

## 2022-08-29 RX ADMIN — SODIUM CHLORIDE 40 MG: 9 INJECTION INTRAMUSCULAR; INTRAVENOUS; SUBCUTANEOUS at 16:59

## 2022-08-29 RX ADMIN — Medication 10 MG: at 14:28

## 2022-08-29 RX ADMIN — FENTANYL CITRATE 25 MCG: 50 INJECTION, SOLUTION INTRAMUSCULAR; INTRAVENOUS at 14:38

## 2022-08-29 RX ADMIN — Medication 160 MG: at 12:37

## 2022-08-29 RX ADMIN — SODIUM CHLORIDE: 9 INJECTION, SOLUTION INTRAVENOUS at 14:00

## 2022-08-29 RX ADMIN — CIPROFLOXACIN 400 MG: 2 INJECTION, SOLUTION INTRAVENOUS at 12:51

## 2022-08-29 RX ADMIN — MAGNESIUM SULFATE HEPTAHYDRATE 1 G: 500 INJECTION, SOLUTION INTRAMUSCULAR; INTRAVENOUS at 12:50

## 2022-08-29 RX ADMIN — GLYCOPYRROLATE 0.2 MG: 0.2 INJECTION INTRAMUSCULAR; INTRAVENOUS at 13:02

## 2022-08-29 RX ADMIN — ACETAMINOPHEN 650 MG: 160 SOLUTION ORAL at 10:44

## 2022-08-29 RX ADMIN — PHENYLEPHRINE HYDROCHLORIDE 100 MCG: 10 INJECTION INTRAVENOUS at 13:26

## 2022-08-29 RX ADMIN — LIDOCAINE HYDROCHLORIDE 100 MG: 20 INJECTION, SOLUTION EPIDURAL; INFILTRATION; INTRACAUDAL; PERINEURAL at 12:37

## 2022-08-29 RX ADMIN — PROPOFOL 200 MG: 10 INJECTION, EMULSION INTRAVENOUS at 12:37

## 2022-08-29 RX ADMIN — FENTANYL CITRATE 50 MCG: 50 INJECTION, SOLUTION INTRAMUSCULAR; INTRAVENOUS at 13:56

## 2022-08-29 RX ADMIN — SODIUM CHLORIDE: 9 INJECTION, SOLUTION INTRAVENOUS at 09:09

## 2022-08-29 RX ADMIN — PHENYLEPHRINE HYDROCHLORIDE 100 MCG: 10 INJECTION INTRAVENOUS at 13:50

## 2022-08-29 RX ADMIN — FENTANYL CITRATE 50 MCG: 50 INJECTION, SOLUTION INTRAMUSCULAR; INTRAVENOUS at 12:37

## 2022-08-29 RX ADMIN — DEXAMETHASONE SODIUM PHOSPHATE 8 MG: 4 INJECTION, SOLUTION INTRAMUSCULAR; INTRAVENOUS at 12:48

## 2022-08-29 RX ADMIN — PHENYLEPHRINE HYDROCHLORIDE 100 MCG: 10 INJECTION INTRAVENOUS at 13:32

## 2022-08-29 RX ADMIN — VECURONIUM BROMIDE 8 MG: 1 INJECTION, POWDER, LYOPHILIZED, FOR SOLUTION INTRAVENOUS at 12:48

## 2022-08-29 RX ADMIN — ALBUMIN (HUMAN) 12.5 G: 12.5 INJECTION, SOLUTION INTRAVENOUS at 12:45

## 2022-08-29 RX ADMIN — FENTANYL CITRATE 50 MCG: 50 INJECTION, SOLUTION INTRAMUSCULAR; INTRAVENOUS at 14:15

## 2022-08-29 RX ADMIN — ENOXAPARIN SODIUM 30 MG: 100 INJECTION SUBCUTANEOUS at 09:09

## 2022-08-29 RX ADMIN — SODIUM CHLORIDE: 9 INJECTION, SOLUTION INTRAVENOUS at 12:30

## 2022-08-29 RX ADMIN — SUGAMMADEX 400 MG: 100 INJECTION, SOLUTION INTRAVENOUS at 13:57

## 2022-08-29 RX ADMIN — CLINDAMYCIN PHOSPHATE 900 MG: 900 INJECTION, SOLUTION INTRAVENOUS at 12:29

## 2022-08-29 ASSESSMENT — PAIN DESCRIPTION - ORIENTATION
ORIENTATION: RIGHT;LEFT
ORIENTATION: LEFT;RIGHT

## 2022-08-29 ASSESSMENT — PAIN - FUNCTIONAL ASSESSMENT: PAIN_FUNCTIONAL_ASSESSMENT: 0-10

## 2022-08-29 ASSESSMENT — PAIN SCALES - GENERAL
PAINLEVEL_OUTOF10: 9
PAINLEVEL_OUTOF10: 3
PAINLEVEL_OUTOF10: 10
PAINLEVEL_OUTOF10: 10
PAINLEVEL_OUTOF10: 0
PAINLEVEL_OUTOF10: 5
PAINLEVEL_OUTOF10: 0

## 2022-08-29 ASSESSMENT — PAIN DESCRIPTION - LOCATION
LOCATION: ABDOMEN

## 2022-08-29 ASSESSMENT — PAIN DESCRIPTION - DESCRIPTORS
DESCRIPTORS: DISCOMFORT;PRESSURE
DESCRIPTORS: SHARP

## 2022-08-29 NOTE — PROGRESS NOTES
Pt awake and oriented, vss, remains on 3L NC, pain controlled on dilaudid PCA- in reach and using appropriately, lap sites intact, family sent to room.  Phase 1 discharge criteria met

## 2022-08-29 NOTE — ANESTHESIA PRE PROCEDURE
Social History:    Social History     Tobacco Use    Smoking status: Never    Smokeless tobacco: Never   Substance Use Topics    Alcohol use: Not Currently                                Counseling given: Not Answered      Vital Signs (Current): There were no vitals filed for this visit.                                            BP Readings from Last 3 Encounters:   08/29/22 118/72   07/26/22 130/72   06/21/22 123/73       NPO Status:  before mn                                                                                BMI:   Wt Readings from Last 3 Encounters:   08/29/22 255 lb (115.7 kg)   08/09/22 266 lb (120.7 kg)   07/26/22 269 lb (122 kg)     There is no height or weight on file to calculate BMI.    CBC:   Lab Results   Component Value Date/Time    WBC 8.2 12/10/2021 09:13 AM    RBC 4.23 12/10/2021 09:13 AM    HGB 12.6 12/10/2021 09:13 AM    HCT 38.0 12/10/2021 09:13 AM    MCV 90.0 12/10/2021 09:13 AM    RDW 14.1 12/10/2021 09:13 AM     12/10/2021 09:13 AM       CMP:   Lab Results   Component Value Date/Time     12/10/2021 09:13 AM    K 3.9 12/10/2021 09:13 AM     12/10/2021 09:13 AM    CO2 30 12/10/2021 09:13 AM    BUN 16 12/10/2021 09:13 AM    CREATININE 0.55 12/10/2021 09:13 AM    GFRAA >60 01/31/2020 08:06 AM    AGRATIO 1.3 01/31/2020 08:06 AM    LABGLOM >60 01/31/2020 08:06 AM    GLUCOSE 94 12/10/2021 09:13 AM    PROT 7.1 12/10/2021 09:13 AM    CALCIUM 9.0 12/10/2021 09:13 AM    BILITOT 0.5 12/10/2021 09:13 AM    ALKPHOS 67 12/10/2021 09:13 AM    AST 18 12/10/2021 09:13 AM    ALT 18 12/10/2021 09:13 AM       POC Tests:   Recent Labs     08/29/22  0905   POCGLU 83       Coags:   Lab Results   Component Value Date/Time    PROTIME 13.2 12/10/2021 09:13 AM    INR 1.0 12/10/2021 09:13 AM       HCG (If Applicable):   Lab Results   Component Value Date    PREGTESTUR Negative 08/29/2022        ABGs: No results found for: PHART, PO2ART, EYW5OKL, EAD6VNB, BEART, W6LNXMKV     Type & Screen (If Applicable):  No results found for: LABABO, LABRH    Drug/Infectious Status (If Applicable):  No results found for: HIV, HEPCAB    COVID-19 Screening (If Applicable): No results found for: COVID19        Anesthesia Evaluation  Patient summary reviewed no history of anesthetic complications:   Airway: Mallampati: II  TM distance: >3 FB   Neck ROM: full  Mouth opening: > = 3 FB   Dental: normal exam         Pulmonary:normal exam  breath sounds clear to auscultation  (+) sleep apnea (still waiting on CPAP machine):                             Cardiovascular:  Exercise tolerance: good (>4 METS),   (+) hypertension: moderate,         Rhythm: regular  Rate: normal                    Neuro/Psych:   (+) psychiatric history: stable with treatment            GI/Hepatic/Renal:   (+) GERD: well controlled,           Endo/Other:    (+) DiabetesType II DM, well controlled, , .                 Abdominal:   (+) obese,           Vascular: Other Findings:             Anesthesia Plan      general     ASA 3     (Pt with bad DAVID--tired and napping all day long every day, still waiting on CPAP machine (over 6 minths now of waiting). Discussed post op slow emergence and hypersomnolence with pt and spouse.)  Induction: intravenous. Anesthetic plan and risks discussed with patient. Plan discussed with CRNA.     Attending anesthesiologist reviewed and agrees with Christiano Wagner MD   8/29/2022

## 2022-08-29 NOTE — CARE COORDINATION
Discharge Planning Note:    Chart reviewed and it appears that patient has minimal needs for discharge at this time. Discussed with patient and requested that case management be notified if discharge needs are identified.     - Current discharge plan is for the patient to return home with no needs. Case management will continue to follow progress and update discharge plan as needed.       Risk of Readmission Score: N/A    SYED CentenoN RN    [facility]  Phone: [phone]

## 2022-08-29 NOTE — PROGRESS NOTES
Pt resting comfortably, pain controlled, instructed pt on dilaudid PCA and left in reach. Pt demonstrates understanding. weaned from simple mask to [facility], abd lap sites x5, IVF infusing as ordered, phase 1 discharge criteria met, awaiting room to be assigned.

## 2022-08-29 NOTE — H&P
[facility]) Physicians   [employer] [facility] [employer]  Darya Moon MD, [facility], [address] [facility] [address], [address] [place] [id number] . Phone: [phone]  Fax: [phone]            Patient's History and Physical from August 16, 2022 was reviewed. Anai Riggins seen and examined. There has been no change. HISTORY OF PRESENT ILLNESS:    Kayli Reynoso is a very pleasant 43 y.o. with Body mass index is 46.64 kg/m². who is presenting for planned Laparoscopic Sleeve Gastrectomy for future weight loss. Past Medical History:        Diagnosis Date    Abdominal hernia     Anxiety     Chronic GERD 1/30/2020    Depression     Diabetes mellitus type 2 in obese ([facility] [address] 1/30/2020    Essential hypertension 1/30/2020    Hyperlipidemia     Morbid obesity with BMI of 50.0-59.9, adult ([facility] 1/30/2020    Obstructive sleep apnea 1/30/2020    Thyroid disease      Past Surgical History:        Procedure Laterality Date    UPPER GASTROINTESTINAL ENDOSCOPY N/A 2/11/2022    EGD BIOPSY performed by Leti Justin MD at [address] [place] [address]     Medications Prior to Admission:   Medications Prior to Admission: LEVEMIR FLEXTOUCH 100 UNIT/ML injection pen,   [employer] PEN NEEDLES 31G X 5 MM MISC,   lisinopril (PRINIVIL;ZESTRIL) 10 MG tablet, Take 10 mg by mouth in the morning. oxybutynin (DITROPAN-XL) 10 MG extended release tablet, Take 10 mg by mouth in the morning. omeprazole (PRILOSEC) 20 MG delayed release capsule, Take 1 capsule by mouth Daily  amphetamine-dextroamphetamine (ADDERALL XR) 10 MG extended release capsule, Take 10 mg by mouth every morning. glipiZIDE (GLUCOTROL) 5 MG tablet, Take 5 mg by mouth 2 times daily (before meals)  metFORMIN (GLUCOPHAGE) 1000 MG tablet, Take 1,000 mg by mouth in the morning and 1,000 mg in the evening. Take before meals. pioglitazone (ACTOS) 30 MG tablet, Take 30 mg by mouth in the morning.   sertraline (ZOLOFT) 100 MG tablet, Take 100 mg by mouth  levothyroxine (SYNTHROID) 175 MCG tablet, Take 175 mcg by mouth  atorvastatin (LIPITOR) 20 MG tablet, Take 20 mg by mouth at bedtime  valACYclovir (VALTREX) 500 MG tablet, Take 500 mg by mouth in the morning. fexofenadine (ALLEGRA) 180 MG tablet, Take 180 mg by mouth in the morning. Allergies: Other, Amoxicillin, and Prozac [fluoxetine hcl]    Social History:   TOBACCO:   reports that she has never smoked. She has never used smokeless tobacco.  ETOH:   reports that she does not currently use alcohol. Family History:       Problem Relation Age of Onset    Hypertension Mother     Stroke Mother     Elevated Lipids Mother     Mental Illness Mother     Hypertension Father     Stroke Father     Elevated Lipids Father     Diabetes Father     Mental Illness Father          REVIEW OF SYSTEMS:    Review of Systems - History obtained from the patient  General ROS: obesity  Psychological ROS: negative  Ophthalmic ROS: negative  Neurological ROS: negative  ENT ROS: negative  Allergy and Immunology ROS: negative  Hematological and Lymphatic ROS: negative  Endocrine ROS: negative  Breast ROS: negative  Respiratory ROS: negative  Cardiovascular ROS: negative  Gastrointestinal ROS:negative  Genito-Urinary ROS: negative  Musculoskeletal ROS: negative   Skin ROS: negative      PHYSICAL EXAM:      /72   Pulse 83   Temp (!) 96.6 °F (35.9 °C) (Temporal)   Resp 16   Ht 5' 2\" (1.575 m)   Wt 255 lb (115.7 kg)   LMP 08/26/2022 (Exact Date)   SpO2 95%   BMI 46.64 kg/m²  I        Physical Exam   Vitals Reviewed   Constitutional: Patient is oriented to person, place, and time. Patient appears well-developed and well-nourished. Patient is active and cooperative. Non-toxic appearance. No distress. HENT:   Head: Normocephalic and atraumatic. Head is without abrasion and without laceration. Hair is normal.   Right Ear: External ear normal. No lacerations. No drainage, swelling . Left Ear: External ear normal. No lacerations. No drainage, swelling. Nose: Nose normal. No nose lacerations or nasal deformity. Eyes: Conjunctivae, EOM and lids are normal. Right eye exhibits no discharge. No foreign body present in the right eye. Left eye exhibits no discharge. No foreign body present in the left eye. No scleral icterus. Neck: Trachea normal and normal range of motion. No JVD present. Pulmonary/Chest: Effort normal. No accessory muscle usage or stridor. No apnea. No respiratory distress. Cardiovascular: Normal rate and no JVD. Abdominal: Normal appearance. Patient exhibits no distension. Musculoskeletal: Normal range of motion. Patient exhibits no edema. Neurological: Patient is alert and oriented to person, place, and time. Patient has normal strength. GCS eye subscore is 4. GCS verbal subscore is 5. GCS motor subscore is 6. Skin: Skin is warm and dry. No abrasion and no rash noted. Patient is not diaphoretic. No cyanosis or erythema. Psychiatric: Patient has a normal mood and affect.  Speech is normal and behavior is normal. Cognition and memory are normal.       DATA:  CBC:   Lab Results   Component Value Date/Time    WBC 8.2 12/10/2021 09:13 AM    RBC 4.23 12/10/2021 09:13 AM    HGB 12.6 12/10/2021 09:13 AM    HCT 38.0 12/10/2021 09:13 AM    MCV 90.0 12/10/2021 09:13 AM    MCH 29.7 12/10/2021 09:13 AM    MCHC 33.1 12/10/2021 09:13 AM    RDW 14.1 12/10/2021 09:13 AM     12/10/2021 09:13 AM    MPV 8.5 12/10/2021 09:13 AM     CMP:    Lab Results   Component Value Date/Time     12/10/2021 09:13 AM    K 3.9 12/10/2021 09:13 AM     12/10/2021 09:13 AM    CO2 30 12/10/2021 09:13 AM    BUN 16 12/10/2021 09:13 AM    CREATININE 0.55 12/10/2021 09:13 AM    GFRAA >60 01/31/2020 08:06 AM    AGRATIO 1.3 01/31/2020 08:06 AM    LABGLOM >60 01/31/2020 08:06 AM    GLUCOSE 94 12/10/2021 09:13 AM    PROT 7.1 12/10/2021 09:13 AM    LABALBU 3.9 12/10/2021 09:13 AM    CALCIUM 9.0 12/10/2021 09:13 AM    BILITOT 0.5 12/10/2021 09:13 AM    ALKPHOS 67 12/10/2021 09:13 AM    AST 18 12/10/2021 09:13 AM    ALT 18 12/10/2021 09:13 AM       ASSESSMENT AND PLAN:      Horacio Mcnamara is a 43 y.o. female with Body mass index is 46.64 kg/m². ,  patient is deemed appropriate candidate for weight loss surgery by our multidisciplinary team.       Following The Metabolic and Bariatric Surgery Accreditation and Quality Improvement Program [facility]), and [employer] (ACS) recommendations, the Bariatric Surgical Risk/Benefit Calculator was used for Horacio Mcnamara. Report was discussed with Payton Gupta and patient wishes to proceed with surgery, fully understanding the risks and benefits. Of note, The [facility] Bariatric Surgical Risk/Benefit Calculator estimates the chance of an unfavorable outcome (such as a complication or death), the chance of remission of weight-related comorbidities, and the patient's BMI, weight change, and percent total weight change after surgery. These quantities are estimated based upon information the patient gives the healthcare provider about prior health history. The estimates are calculated using data from a large number of patients who had a primary bariatric surgical procedure similar to the one the patient may have. Please note the risk percentages, remission percentages, BMI, weight change, and percent total weight change provided to you by the risk/benefit calculator are only estimates. These estimates only take certain information into account. There may be other factors that are not included in the estimate which may increase or decrease the risk of a complication, the chance of remission of a weight-related comorbidity, or the amount of weight the patient loses. These estimates are not a guarantee of results. A complication after surgery may happen even if the risk is low, a weight-related comorbidity may not go into remission even if the chances are high, and a patient may lose more or less weight than predicted.  This information is not intended to replace the advice of a doctor or healthcare provider about the diagnosis, treatment, or potential outcomes. The Provider is not responsible for medical decisions that may be made by the patient based on the risk/benefit calculator estimates, since these estimates are provided for informational purposes. Patients should always consult their doctor or other health care provider before deciding on a treatment plan. Both open and laparoscopic approach were explained in details. Benefits and risks explained. Informed consent obtained. Risks including but not limited to; Infection, bleeding, gastric leak or obstruction, persistent nausea, vomiting, or reflux, injury to surrounding structures, risks of anesthesia, stricture, delayed gastric emptying, staple line leak, incisional hernia, malnutrition , hair loss, and/ or Conversion to Open surgery may be necessary. Failure to lose or maintain weight loss, Gallstones or Kidney Stones, Deep Venous Thrombosis , pulmonary embolism and / or death. With obesity and/or Fatty liver , I may elect to perform liver core biopsy to have  Baseline idea on liver pathology if there is abnormal Liver Functions or if there is hepatomegaly &/or lesion, risks include but not limited to bile leak, liver hematoma or failure to diagnose a condition. Yobani Diamond understands that there may be a need to perform other urgent or necessary procedures that were unanticipated. Ulice Bounds consent to the performance of any additional procedures determined during the original procedure to be in their best interests and where delay might cause additional harm or put patient at risk for additional anesthesia risk for required by a second procedure and that will be determined by the surgeon.     I did explain thoroughly to the patient that compliance with pre- and post op diet and other recommendations are integral part to improve the chances of successful weight loss and also not following it could end with serious health complications. We discussed that our goal is to ameliorate the medical problems and not to obtain a specific body mass index. Patient understands the risks and benefits and wishes to proceed with the procedure. Also understands if BMI is lower than 40 without significant co morbid conditions, concerns for risks of surgery being somewhat higher over long run, however patient wants to proceed and fully understands the risks. Clearly BMI over 35 does impose very serious health risks as well chances of losing weight on diet only is very limited and sustaining weight loss is even less, thus surgery is certainly recommended for long term weight loss and better health overall given compliance. I advised the patient that we can't guarantee final insurance approval.      The patient was counseled at length about the risks of leeanne Covid-19 during their perioperative period and any recovery window from their procedure. The patient was made aware that leeanne Covid-19  may worsen their prognosis for recovering from their procedure  and lend to a higher morbidity and/or mortality risk. All material risks, benefits, and reasonable alternatives including postponing the procedure were discussed. The patient does wish to proceed with the procedure at this time. 1.  Plan for Laparoscopic Sleeve Gastrectomy with General Anesthesia.           Electronically signed by Nithya Hunter MD , FACS, FASMBS on 8/29/2022 at 10:30 AM

## 2022-08-29 NOTE — PROGRESS NOTES
Pt admitted to PACU, awakening and following commands, vss, abd lap sitesx5 CD&I-ice pack applied to site, NSR on tele, pt c/o pain.  Will medicate per orders

## 2022-08-29 NOTE — PROGRESS NOTES
Patient admitted to floor postop gastric sleeve. PCA pump active, vitals every 2 hours. Pain controlled. Ambulating to bathroom and in hallway. Foot pumps in place. Voiding OK. Lap sites clean and dry approximated with surgical glue. Patient is from home and plans to return home upon discharge.

## 2022-08-29 NOTE — PROGRESS NOTES
08/29/22 1801   Incentive Spirometry Tx   Treatment Effort Initial;Assisted by RT   Predicted Volume 563   Achieved Volume (mL) 1100 mL

## 2022-08-29 NOTE — PROGRESS NOTES
Nursing care provided to prep patient for surgery. Patient lost 12 lbs. on pre-op diet, informed surgeon. Pre-op orders completed. Bilateral foot pneumatic sleeves applied. Teaching / education initiated regarding perioperative experience, post-op expectations and plan of care, and pain management during hospital stay. Patient verbalized understanding. The care plan and education has been reviewed and mutually agreed upon with the patient.

## 2022-08-29 NOTE — ANESTHESIA POSTPROCEDURE EVALUATION
Department of Anesthesiology  Postprocedure Note    Patient: Sherron Goldberg  MRN: [id number]  YOB: 1980  Date of evaluation: 8/29/2022      Procedure Summary     Date: 08/29/22 Room / Location: [address]    Anesthesia Start: 9355 Anesthesia Stop: 5170    Procedures:       LAPAROSCOPIC SLEEVE GASTRECTOMY-ETHICON/MEDTRONIC (Abdomen)      LAPAROSCOPIC VENTRAL HERNIA  REPAIR (Abdomen) Diagnosis:       Morbid obesity ([address]      (Morbid obesity ([address] [E66.01])    Surgeons: Kassi Schafer MD Responsible Provider: Flaca Hamilton MD    Anesthesia Type: general ASA Status: 3          Anesthesia Type: No value filed.     Rosy Phase I: Rosy Score: 9    Rosy Phase II:        Anesthesia Post Evaluation    Patient location during evaluation: PACU  Patient participation: complete - patient participated  Level of consciousness: awake and alert  Airway patency: patent  Nausea & Vomiting: no nausea and no vomiting  Complications: no  Cardiovascular status: blood pressure returned to baseline  Respiratory status: acceptable  Hydration status: euvolemic  Multimodal analgesia pain management approach

## 2022-08-29 NOTE — OP NOTE
indicated procedures. A Time Out was held and the above information confirmed. The patient was placed in the supine position and general anesthesia was induced, along with placement of orogastric tube, Venodyne boots. Lovenox SQ, Emend and IV Antibiotics given pre-operatively. All pressure points were padded properly. Patient prepped and draped in sterile fashion. A left upper quadrant incision was made and a veres needle was inserted after confirming with saline drop test.  After adequate pneumoperitoneum was obtained, a 5 mm trocar was inserted. This was followed by a endoscope which confirmed intra-abdominal placement and there was no injury on initial trocar placement. Additional ports were placed in the standard positions under direct vision. The abdomen was initially explored and noted ventral hernia in periumbilical region. A liver retractor was inserted through a small incision in the upper midline, lifted the liver upward, and was then secured to the OR table. The pylorus was identified and measurement was taken approximately 4-6 cm from the pylorus along the greater curvature of the stomach. The harmonic scalpel / ligasure was used to take down the attachments and short gastric vessels along the greater curve of the stomach. This was continued until all attachments were taken down and continued to the gastro-esophageal junction. A 34 [place]ian dilator was placed along the lesser curvature and into the pylorus. A stapler was fired along the dilator to create an appropriate sized gastric sleeve pouch. Purple followed Tan firings were used to create the sleeve. The staple line looked very healthy and no bleeding from staple line. The stomach was confirmed to be completely divided with uniform shape,  no twist in the sleeve and wide patent incisura. A 2-0 vicryl suture was used to over-sew and imbricate the sleeve staple line. The staple line was completely over-sewn.   The dilator was removed and an Edlich tube was advanced across the sleeve to ensure patency mainly at incisura, then retracted to just above the GE junction. The stomach distal to the staple line was clamped and methylene blue saline was injected under pressure confirming No obstruction (flow noted to duodenum) and No leak. Patient has periumbilical / ventral hernia, through separate incisions, total of two 0.0 Ethibond stitches using suture passing device under direct visualization were used to close the defect. Pneumoperitoneum removed and stitches tied , then repair was inspected after re-applying pneumoperitoneum and it was intact. The abdomen was carefully inspected and there was no bleeding or any other abnormality. The stomach was brought out through the RUQ incision. Hemostasis was confirmed. Snow applied along suture line and/or biopsy sites to ensure hemostasis. The 15 and 12 port sites was closed using 0.0 Vicryl  suture at the level of the fascia and that was done under direct vision with the laparoscope to ensure proper closure and nothing entrapped. Local Anesthetic used at port sites. The trocar site skin wounds were closed using 4.0 Vicryl after copious Irrigation of the wounds. Dermabond / or steri strips applied. Instrument, sponge, and needle counts were correct at the conclusion of the case. Findings:  As above. Estimated Blood Loss:  Minimal           Drains: none           Total IV Fluids: 1250 ml           Specimens: Stomach (Subtotal)            Complications:  None; patient tolerated the procedure well. Disposition: PACU - hemodynamically stable. Condition: stable    Attending Attestation: I was present and scrubbed for the entire procedure.       Electronically signed by Nithya Hunter MD , FACS, FASMBS  on 8/29/2022 at 1:57 PM

## 2022-08-30 ENCOUNTER — APPOINTMENT (OUTPATIENT)
Dept: GENERAL RADIOLOGY | Age: 42
DRG: 621 | End: 2022-08-30
Attending: SURGERY
Payer: COMMERCIAL

## 2022-08-30 VITALS
BODY MASS INDEX: 46.93 KG/M2 | RESPIRATION RATE: 16 BRPM | TEMPERATURE: 98.9 F | WEIGHT: 255 LBS | HEART RATE: 72 BPM | DIASTOLIC BLOOD PRESSURE: 69 MMHG | SYSTOLIC BLOOD PRESSURE: 109 MMHG | OXYGEN SATURATION: 96 % | HEIGHT: 62 IN

## 2022-08-30 LAB
ANION GAP SERPL CALCULATED.3IONS-SCNC: 8 MMOL/L (ref 3–16)
BUN BLDV-MCNC: 6 MG/DL (ref 7–20)
CALCIUM SERPL-MCNC: 8.6 MG/DL (ref 8.3–10.6)
CHLORIDE BLD-SCNC: 103 MMOL/L (ref 99–110)
CO2: 25 MMOL/L (ref 21–32)
CREAT SERPL-MCNC: <0.5 MG/DL (ref 0.6–1.1)
GFR AFRICAN AMERICAN: >60
GFR NON-AFRICAN AMERICAN: >60
GLUCOSE BLD-MCNC: 118 MG/DL (ref 70–99)
HCT VFR BLD CALC: 34.8 % (ref 36–48)
HEMOGLOBIN: 11.4 G/DL (ref 12–16)
MCH RBC QN AUTO: 29.5 PG (ref 26–34)
MCHC RBC AUTO-ENTMCNC: 32.9 G/DL (ref 31–36)
MCV RBC AUTO: 89.7 FL (ref 80–100)
PDW BLD-RTO: 14 % (ref 12.4–15.4)
PLATELET # BLD: 277 K/UL (ref 135–450)
PMV BLD AUTO: 8.3 FL (ref 5–10.5)
POTASSIUM SERPL-SCNC: 3.7 MMOL/L (ref 3.5–5.1)
RBC # BLD: 3.88 M/UL (ref 4–5.2)
SODIUM BLD-SCNC: 136 MMOL/L (ref 136–145)
WBC # BLD: 12.5 K/UL (ref 4–11)

## 2022-08-30 PROCEDURE — 85027 COMPLETE CBC AUTOMATED: CPT

## 2022-08-30 PROCEDURE — 6360000002 HC RX W HCPCS: Performed by: NURSE PRACTITIONER

## 2022-08-30 PROCEDURE — C9113 INJ PANTOPRAZOLE SODIUM, VIA: HCPCS | Performed by: SURGERY

## 2022-08-30 PROCEDURE — 74240 X-RAY XM UPR GI TRC 1CNTRST: CPT

## 2022-08-30 PROCEDURE — 80048 BASIC METABOLIC PNL TOTAL CA: CPT

## 2022-08-30 PROCEDURE — 6360000002 HC RX W HCPCS: Performed by: SURGERY

## 2022-08-30 PROCEDURE — 6360000004 HC RX CONTRAST MEDICATION: Performed by: SURGERY

## 2022-08-30 PROCEDURE — APPSS180 APP SPLIT SHARED TIME > 60 MINUTES: Performed by: NURSE PRACTITIONER

## 2022-08-30 PROCEDURE — 99024 POSTOP FOLLOW-UP VISIT: CPT | Performed by: NURSE PRACTITIONER

## 2022-08-30 PROCEDURE — 2580000003 HC RX 258: Performed by: SURGERY

## 2022-08-30 PROCEDURE — A4216 STERILE WATER/SALINE, 10 ML: HCPCS | Performed by: SURGERY

## 2022-08-30 PROCEDURE — 6370000000 HC RX 637 (ALT 250 FOR IP): Performed by: SURGERY

## 2022-08-30 PROCEDURE — 36415 COLL VENOUS BLD VENIPUNCTURE: CPT

## 2022-08-30 RX ORDER — OXYCODONE HYDROCHLORIDE AND ACETAMINOPHEN 5; 325 MG/1; MG/1
1 TABLET ORAL EVERY 6 HOURS PRN
Qty: 28 TABLET | Refills: 0 | Status: SHIPPED | OUTPATIENT
Start: 2022-08-30 | End: 2022-09-06

## 2022-08-30 RX ORDER — ONDANSETRON 8 MG/1
8 TABLET, ORALLY DISINTEGRATING ORAL EVERY 8 HOURS PRN
Status: DISCONTINUED | OUTPATIENT
Start: 2022-08-30 | End: 2022-08-30 | Stop reason: HOSPADM

## 2022-08-30 RX ORDER — PROMETHAZINE HYDROCHLORIDE 6.25 MG/5ML
12.5 SYRUP ORAL EVERY 6 HOURS PRN
Status: DISCONTINUED | OUTPATIENT
Start: 2022-08-30 | End: 2022-08-30 | Stop reason: HOSPADM

## 2022-08-30 RX ORDER — ONDANSETRON 4 MG/1
8 TABLET, ORALLY DISINTEGRATING ORAL EVERY 8 HOURS PRN
Qty: 30 TABLET | Refills: 2 | Status: SHIPPED | OUTPATIENT
Start: 2022-08-30

## 2022-08-30 RX ORDER — OXYCODONE HYDROCHLORIDE AND ACETAMINOPHEN 5; 325 MG/1; MG/1
1 TABLET ORAL EVERY 4 HOURS PRN
Status: DISCONTINUED | OUTPATIENT
Start: 2022-08-30 | End: 2022-08-30 | Stop reason: HOSPADM

## 2022-08-30 RX ORDER — OXYCODONE HYDROCHLORIDE AND ACETAMINOPHEN 5; 325 MG/1; MG/1
2 TABLET ORAL EVERY 4 HOURS PRN
Status: DISCONTINUED | OUTPATIENT
Start: 2022-08-30 | End: 2022-08-30 | Stop reason: HOSPADM

## 2022-08-30 RX ORDER — ONDANSETRON 4 MG/1
8 TABLET, ORALLY DISINTEGRATING ORAL EVERY 8 HOURS PRN
Qty: 30 TABLET | Refills: 0 | Status: SHIPPED | OUTPATIENT
Start: 2022-08-30

## 2022-08-30 RX ORDER — KETOROLAC TROMETHAMINE 30 MG/ML
15 INJECTION, SOLUTION INTRAMUSCULAR; INTRAVENOUS EVERY 6 HOURS
Status: COMPLETED | OUTPATIENT
Start: 2022-08-30 | End: 2022-08-30

## 2022-08-30 RX ADMIN — SODIUM CHLORIDE, PRESERVATIVE FREE 10 ML: 5 INJECTION INTRAVENOUS at 07:43

## 2022-08-30 RX ADMIN — ENOXAPARIN SODIUM 30 MG: 100 INJECTION SUBCUTANEOUS at 07:43

## 2022-08-30 RX ADMIN — SODIUM CHLORIDE 40 MG: 9 INJECTION INTRAMUSCULAR; INTRAVENOUS; SUBCUTANEOUS at 07:38

## 2022-08-30 RX ADMIN — KETOROLAC TROMETHAMINE 15 MG: 30 INJECTION, SOLUTION INTRAMUSCULAR at 11:47

## 2022-08-30 RX ADMIN — IOHEXOL 50 ML: 350 INJECTION, SOLUTION INTRAVENOUS at 08:39

## 2022-08-30 ASSESSMENT — PAIN DESCRIPTION - PAIN TYPE: TYPE: SURGICAL PAIN

## 2022-08-30 ASSESSMENT — PAIN DESCRIPTION - LOCATION
LOCATION: ABDOMEN
LOCATION: ABDOMEN

## 2022-08-30 ASSESSMENT — PAIN SCALES - GENERAL
PAINLEVEL_OUTOF10: 7
PAINLEVEL_OUTOF10: 7
PAINLEVEL_OUTOF10: 8
PAINLEVEL_OUTOF10: 2
PAINLEVEL_OUTOF10: 2

## 2022-08-30 ASSESSMENT — PAIN DESCRIPTION - DESCRIPTORS
DESCRIPTORS: DISCOMFORT;PRESSURE
DESCRIPTORS: ACHING

## 2022-08-30 ASSESSMENT — PAIN DESCRIPTION - ORIENTATION: ORIENTATION: RIGHT

## 2022-08-30 ASSESSMENT — PAIN - FUNCTIONAL ASSESSMENT
PAIN_FUNCTIONAL_ASSESSMENT: ACTIVITIES ARE NOT PREVENTED
PAIN_FUNCTIONAL_ASSESSMENT: ACTIVITIES ARE NOT PREVENTED

## 2022-08-30 ASSESSMENT — PAIN DESCRIPTION - FREQUENCY: FREQUENCY: INTERMITTENT

## 2022-08-30 ASSESSMENT — PAIN DESCRIPTION - ONSET: ONSET: ON-GOING

## 2022-08-30 NOTE — PLAN OF CARE
Problem: Skin/Tissue Integrity - Adult  Goal: Skin integrity remains intact  Outcome: Progressing     Problem: Skin/Tissue Integrity - Adult  Goal: Incisions, wounds, or drain sites healing without S/S of infection  Outcome: Progressing     Problem: Gastrointestinal - Adult  Goal: Minimal or absence of nausea and vomiting  Outcome: Progressing     Problem: Gastrointestinal - Adult  Goal: Maintains or returns to baseline bowel function  Outcome: Progressing     Problem: Metabolic/Fluid and Electrolytes - Adult  Goal: Electrolytes maintained within normal limits  Outcome: Progressing     Problem: Metabolic/Fluid and Electrolytes - Adult  Goal: Hemodynamic stability and optimal renal function maintained  Outcome: Progressing

## 2022-08-30 NOTE — PROGRESS NOTES
[facility]) Physicians   General & Laparoscopic Surgery  [employer] [facility] [employer]       Pt seen and examined    S/p laparoscopic sleeve gastrectomy & 1ry ventral hernia repair. The patient is ambulating in lópez. Pain is well controlled. There is no nausea and/or vomiting. There are no other complaints or questions. Vitals:    08/30/22 0430 08/30/22 0645 08/30/22 0730 08/30/22 0930   BP: 109/76 100/61 110/71 109/72   Pulse: 76 69 61 56   Resp: 20 19 18 23   Temp:   98.1 °F (36.7 °C) 98 °F (36.7 °C)   TempSrc:   Oral Oral   SpO2: 95% 94% 92% 95%   Weight:       Height:         Abdomen is soft, appropriately tender, incisions stable with no erythema. Breath sounds are clear bilaterally. Bowel sounds are hypoactive in all quadrants.     Data  CBC:   Lab Results   Component Value Date/Time    WBC 12.5 08/30/2022 04:07 AM    RBC 3.88 08/30/2022 04:07 AM    HGB 11.4 08/30/2022 04:07 AM    HCT 34.8 08/30/2022 04:07 AM    MCV 89.7 08/30/2022 04:07 AM    MCH 29.5 08/30/2022 04:07 AM    MCHC 32.9 08/30/2022 04:07 AM    RDW 14.0 08/30/2022 04:07 AM     08/30/2022 04:07 AM    MPV 8.3 08/30/2022 04:07 AM     BMP:    Lab Results   Component Value Date/Time     08/30/2022 04:07 AM    K 3.7 08/30/2022 04:07 AM     08/30/2022 04:07 AM    CO2 25 08/30/2022 04:07 AM    BUN 6 08/30/2022 04:07 AM    LABALBU 3.9 12/10/2021 09:13 AM    CREATININE <0.5 08/30/2022 04:07 AM    CALCIUM 8.6 08/30/2022 04:07 AM    GFRAA >60 08/30/2022 04:07 AM    LABGLOM >60 08/30/2022 04:07 AM    GLUCOSE 118 08/30/2022 04:07 AM     Current Inpatient Medications    Current Facility-Administered Medications: diphenhydrAMINE (BENADRYL) injection 12.5 mg, 12.5 mg, IntraVENous, Q6H PRN  hydrALAZINE (APRESOLINE) injection 10 mg, 10 mg, IntraVENous, Q2H PRN  hyoscyamine (LEVSIN) 500 MCG/ML injection 250 mcg, 250 mcg, IntraVENous, Q4H PRN  labetalol (NORMODYNE;TRANDATE) injection 10 mg, 10 mg, IntraVENous, Q2H PRN  lidocaine 4 % external patch 1 patch, 1 patch, TransDERmal, Daily  pantoprazole (PROTONIX) 40 mg in sodium chloride (PF) 10 mL injection, 40 mg, IntraVENous, Daily  promethazine (PHENERGAN) injection 6.25 mg, 6.25 mg, IntraMUSCular, Q6H PRN  naloxone 0.4 mg in 10 mL sodium chloride syringe, , IntraVENous, PRN  lactated ringers infusion, , IntraVENous, Continuous  sodium chloride flush 0.9 % injection 5-40 mL, 5-40 mL, IntraVENous, 2 times per day  sodium chloride flush 0.9 % injection 5-40 mL, 5-40 mL, IntraVENous, PRN  0.9 % sodium chloride infusion, , IntraVENous, PRN  HYDROmorphone HCl PF (DILAUDID) injection 0.5 mg, 0.5 mg, IntraVENous, Q3H PRN  ondansetron (ZOFRAN) injection 4 mg, 4 mg, IntraVENous, Q6H PRN  [START ON 9/1/2022] scopolamine (TRANSDERM-SCOP) transdermal patch 1 patch, 1 patch, TransDERmal, Q72H  metoclopramide (REGLAN) injection 10 mg, 10 mg, IntraVENous, Q6H PRN  enoxaparin Sodium (LOVENOX) injection 30 mg, 30 mg, SubCUTAneous, 2 times per day  HYDROmorphone (DILAUDID) 0.2 mg/mL PCA, , IntraVENous, Continuous    Patient Active Problem List   Diagnosis    Diabetes mellitus type 2 in obese ([facility] [address]    Morbid obesity with BMI of 45.0-49.9, adult ([facility] [address]    Essential hypertension    Chronic GERD    Observed sleep apnea    Mixed hyperlipidemia    Ventral hernia without obstruction or gangrene    S/P laparoscopic sleeve gastrectomy     A/P  Jazzmine Monique is a 43 y.o. female pod#1, s/p laparoscopic sleeve gastrectomy & 1ry ventral hernia repair. Stable overall. UGI with no leak/obstruction, will start on Phase I diet. Patient has voided postoperatively and urine output is WNL. Will continue to monitor. Will discontinue PCA and start oral pain medications. Will order one dose of IV Toradol. Patient will continue to wear abdominal binder when walking for support. Patient needs to ambulate in the lópez every 60-90 minutes. Patient needs to utilize incentive spirometer 10 times per hour while awake.   Patient will continue icing incisions. Plan for discharge today if nausea remains controlled, patient continues to void and is tolerating Phase I diet. Discussed with Dr. Arabella Sandhoff and Nursing Staff.     PORTIA Chauhan

## 2022-08-30 NOTE — PLAN OF CARE
Problem: Chronic Conditions and Co-morbidities  Goal: Patient's chronic conditions and co-morbidity symptoms are monitored and maintained or improved  Outcome: Progressing     Problem: Discharge Planning  Goal: Discharge to home or other facility with appropriate resources  Outcome: Progressing  Flowsheets (Taken 8/29/2022 1650 by Sariah Horn RN)  Discharge to home or other facility with appropriate resources: Identify barriers to discharge with patient and caregiver     Problem: Pain  Goal: Verbalizes/displays adequate comfort level or baseline comfort level  Outcome: Progressing     Problem: ABCDS Injury Assessment  Goal: Absence of physical injury  Outcome: Progressing

## 2022-08-30 NOTE — PROGRESS NOTES
Shift assessment complete. Vitals q2h, stable. Pain controlled with PCA pump. Pt. ambulating to bathroom and in hallway. Visitor at bedside. Call light within reach. Pt. denies further needs at this time. Plan of care and education mutually agreed upon with patient.

## 2022-08-30 NOTE — DISCHARGE INSTRUCTIONS
Unless otherwise noted you will crush all your medications for the first 2 weeks post op and mix them with clears for the first 2 days and then with your pureed food for the remainder of the 2 weeks. If you do not tolerate your medications crushed you may quarter or cut in half and take one piece at a time. You may take your omeprazole (Prilosec), amphetamine-dextroamphetamine (Adderall XR), oxybutynin (Ditropan XL) & levothyroxine (Synthroid) whole as they cannot be opened or crushed. After two weeks, you may start taking all your pills whole. Please make sure when taking whole pills, you do not take them all at once. Take them one at a time and allow a minute or so between each one. Medications    As discussed during your pre-surgical visits there may need to be changes made to your home medications following surgery. A benefit of weight loss and healthy dietary habits can be an improvement in your blood pressure, blood sugars and edema (swelling). Blood Pressure    Following surgery monitor your blood pressure to evaluate for low blood pressure. A normal blood pressure is generally considered less than 120/80. If your blood pressure is lower than normal and/or youre experiencing symptoms of hypotension such as fatigue, blurred vision, lightheadedness or dizziness please contact your PCP for them to evaluate the need to lower or discontinue your blood pressure medication(s). Blood Sugar    Following surgery monitor your blood sugar to evaluate for low blood sugar. If your blood sugar is lower than normal or youre experiencing symptoms of hypoglycemia such as shakiness, sweating, clamminess, or lightheadedness please contact your PCP and/or endocrinologist (if you have one) for them to evaluate the need to lower or discontinue your blood sugar medication(s).  If you do not have to monitor your blood sugars, your PCP and/or us will be monitoring your blood sugar and HgbA1c through your future labs.    Activity  You are encouraged to walk through the entire postoperative period as this will help with preventing clots, pneumonia and constipation. You are restricted from lifting anything greater than 10 lbs for the first 6 weeks after surgery. You may shower starting on postoperative day #2 (second day after surgery), but should not get into baths, pools and/or hot tubs until the provider releases you to do so. Driving  You should not drive for at least the first week after your surgery and/or if you are still taking pain medication. Most patients generally drive to their 2-week postoperative appointment. Constipation  Constipation can occur following surgery. This is due to anesthesia, decreased fluids and fiber in your diet, drinking protein shakes and taking pain medication. Make sure you are getting 48-64 ounces of fluids per day and walking. If you develop constipation you may use docusate sodium (Colace) or Dulcolax which are over-the-counter stool softeners and can be taken 1-2 times per day. If the stool softeners do not help in 2-3 days you can use Miralax which is an over-the-counter laxative and can be taken daily. If this does not help please call the office for further instructions. Pain Management  Following surgery, you will have some pain related to your incisions and from the gas instilled into your abdomen during surgery. It is important for you to walk frequently after surgery to help dissipate the gas pain and to keep your incisions from getting stiff. To help with incisional pain we will provide a prescription for pain medication, but also recommend that you ice your incisions for the days and weeks after surgery. When icing your incisions follow the recommendation of 15 minutes on/15 minutes off rotating to different areas. This will help with the swelling and inflammation related to your incisional pain.  In addition, keep wearing your abdominal binder when you are up and about to help with support. Vitamins (Fusion)  When starting the Fusion multivitamins take four per day. You will take two in the morning and two in the evening. Phase I Diet  Please reference the Preoperative Class diet instructions and the Bariatric Surgery Dietary Discharge Instructions handout reviewed in the hospital. Artur York will be on a clear liquid diet for postop day one and two. Your goal is to get in 48-64oz of fluids daily. Refer to the Clear Liquids List for a list of acceptable clear liquids. Phase II Diet  Please reference the Preoperative Class diet instructions, the Bariatric Surgery Dietary Discharge Instructions handout and the Ideal Meal Process handout reviewed in the hospital. You will start your full liquid and pureed/blenderized foods on postop day three and will continue through postop day twenty. Your priority is to still get in 48-64oz of fluids daily, but you can start back on two of your protein shakes and begin eating approved full liquid & pureed/blenderized food. Your goal is 60-80 grams of protein daily. When drinking your protein shakes you will not be able to drink them as fast as you could before surgery. However, we do not want you to drink your shakes for longer than 40 minutes, so that you are able to get back to your fluids. Food Diary  Please make sure you are keeping a food diary of everything you eat and drink starting on postop day three and bring it with you to your postop visits. Incentive Spirometer  You will continue to use your incentive spirometer (breathing device given to you in hospital) for the first two weeks after surgery to help prevent pneumonia and to open your lungs completely. You will use it ten times per hour while awake. Dermabond Topical Skin Adhesive Care    Dermabond is a clear, sterile liquid skin adhesive that holds wound/incision edges together.  The adhesive will usually remain in place for 5 to 10 days, then naturally wear or slough off your skin. Do not scratch, rub, or pick at the Skin Affix adhesive film. Do not apply liquid or ointment medications, soap, powders or lotions to the incision while the Dermabond is in place. You may shower 48 hours after your surgery and briefly wet the Dermabond. Do not sit in a tub of water or swim. Do not soak or scrub your incision. After showering, gently blot your incision dry. No tape or any type of bandage/covering is required over the Dermabond. PLEASE CONTACT YOUR PRIMARY CARE PHYSICIAN AT DISCHARGE AND COME UP WITH A PLAN FOR THE MANAGEMENT OF YOUR BLOOD PRESSURE AND BLOOD SUGAR MEDICATIONS. IF YOU DO NOT HAVE A WAY TO CHECK YOUR BLOOD PRESSURE AT HOME I SUGGEST THAT YOU  A BLOOD PRESSURE CUFF AT THE STORE TO MONITOR YOUR BLOOD PRESSURE SO THAT YOUR PRIMARY CARE PHYSICIAN CAN EVALUATE THE NEED TO CHANGE ANY OF YOUR MEDICATIONS.

## 2022-08-30 NOTE — PROGRESS NOTES
CLINICAL PHARMACY NOTE: MEDS TO BEDS    Total # of Prescriptions Filled: 2   The following medications were delivered to the patient:  Ondansetron  Percocet  5    Additional Documentation:  Patients partner picked up in op pharmacy

## 2022-08-30 NOTE — DISCHARGE SUMMARY
The patient was admitted on day of surgery and underwent a laparoscopic sleeve gastrectomy & 1ry ventral hernia repair. Surgery went well and the patient went to our post-op bariatric floor. Overnight, the patient had stable vitals signs, good urine output and ambulated in the halls. On POD #1 the patient underwent an UGI which revealed no leak or obstruction. Phase I diet was started and the patient tolerated well. The patient has no N/V, tolerating diet, ambulating and pain controlled on oral medication. The patient was discharged home today in stable condition. The patient will f/u in 2 weeks and was given dietary and ambulation instruction. The patient was instructed to call if there are any questions or concerns.      Patient Active Problem List   Diagnosis    Diabetes mellitus type 2 in obese ([address]    Morbid obesity with BMI of 45.0-49.9, adult ([address]    Essential hypertension    Chronic GERD    Observed sleep apnea    Mixed hyperlipidemia    Ventral hernia without obstruction or gangrene    S/P laparoscopic sleeve gastrectomy

## 2022-08-30 NOTE — PROGRESS NOTES
Patient s/p sleeve gastrectomy. VSS. Abdomen soft, appropriately tender, incision stable with no erythema. Patient ambulating in hallway, with binder in place. SCD's in place while in bed. I/O being monitored, patient remains NPO. Patient encouraged to use incentive spirometer. Pain controlled with PCA. Will continue to monitor pain control. UGI in this morning. Discussed with patient if UGI is normal will be able to start Phase 1 clear liquid diet. The care plan and education has been reviewed and mutually agreed upon with the patient. 5:40 PM  IV removed prior dc. The care plan and education has been resolved and completed. Discharge instructions and medications reviewed with patient. Patient voices understanding and denies further concerns at this time. Patient discharged home per orders with all personal belongings. Patient requested to ambulate to private transportation home. This RN ambulated with patient to her private transportation home.

## 2022-09-02 ENCOUNTER — TELEPHONE (OUTPATIENT)
Dept: BARIATRICS/WEIGHT MGMT | Age: 42
End: 2022-09-02

## 2022-09-02 NOTE — TELEPHONE ENCOUNTER
Patient is s/p sleeve 8/29/22. Left very hard to understand voicemail. States she wanted 2 rtw notes for her 2 jobs for Wednesday and Friday next week. She doesn't have a 2wk post op appt until 9/13/22 and Dr. Luis Pascal is out of office on 9/6/22. Please advise patient.   [phone]

## 2022-09-06 ENCOUNTER — TELEPHONE (OUTPATIENT)
Dept: BARIATRICS/WEIGHT MGMT | Age: 42
End: 2022-09-06

## 2022-09-13 ENCOUNTER — OFFICE VISIT (OUTPATIENT)
Dept: BARIATRICS/WEIGHT MGMT | Age: 42
End: 2022-09-13

## 2022-09-13 VITALS
WEIGHT: 244 LBS | OXYGEN SATURATION: 98 % | BODY MASS INDEX: 44.9 KG/M2 | RESPIRATION RATE: 18 BRPM | HEART RATE: 97 BPM | HEIGHT: 62 IN | DIASTOLIC BLOOD PRESSURE: 70 MMHG | SYSTOLIC BLOOD PRESSURE: 118 MMHG

## 2022-09-13 DIAGNOSIS — Z98.84 S/P LAPAROSCOPIC SLEEVE GASTRECTOMY: Primary | ICD-10-CM

## 2022-09-13 PROCEDURE — 99024 POSTOP FOLLOW-UP VISIT: CPT | Performed by: SURGERY

## 2022-09-13 NOTE — PROGRESS NOTES
3.9 12/10/2021 09:13 AM    AGRATIO 1.3 01/31/2020 08:06 AM    BILITOT 0.5 12/10/2021 09:13 AM    ALKPHOS 67 12/10/2021 09:13 AM    ALT 18 12/10/2021 09:13 AM    AST 18 12/10/2021 09:13 AM    GLOB 2.9 01/31/2020 08:06 AM     Lab Results   Component Value Date/Time    CHOL 110 12/10/2021 09:13 AM    TRIG 96 12/10/2021 09:13 AM    HDL 41 12/10/2021 09:13 AM    LDLCALC 50 12/10/2021 09:13 AM    LABVLDL 26 01/31/2020 08:06 AM     Lab Results   Component Value Date/Time    TSHREFLEX 17.12 01/31/2020 08:06 AM     Lab Results   Component Value Date/Time    IRON 70 12/10/2021 09:13 AM    TIBC 332 12/10/2021 09:13 AM    LABIRON 21 12/10/2021 09:13 AM     Lab Results   Component Value Date/Time    APGIPFEO41 516 12/10/2021 09:13 AM    FOLATE 11.16 01/31/2020 08:06 AM     Lab Results   Component Value Date/Time    VITD25 43.1 12/10/2021 09:13 AM     Lab Results   Component Value Date/Time    LABA1C 8.4 12/10/2021 09:13 AM     12/10/2021 09:13 AM        The patient's current Body mass index is 44.63 kg/m². (9/13/22). Since her last visit she has lost 22 lbs since last visit and total of 41 lbs. Tawnya Matos underwent dietary counseling, and I have reviewed and agree with the dietary counseling, and I have reviewed and agree with the diet plan. There are no changes in the patients medical history or physical exam.     Denies nausea, vomiting, fevers, chills, hiccups, shoulder pain, heartburn, dysphagia wound drainage/bulge nor change in color around incision. Bowels working ok and making urine. The incisions healing well. Overall I'm really pleased with Tawnya Matos recovery. Pathology results were discussed with the patient. Tawnya Matos advised to sign  release form for utilizing the 3 months complimentary membership in the [address] starting after 6 weeks post op.     I did explain thoroughly to the patient that compliance with  post op diet and other recommendations are integral part to improve the chances of successful weight loss and also not following it could end with serious health complications. I advised Dilip Bean to gradually advance activity and  to call if there are any questions or concerns. Dilip Bean will follow up in 4 weeks. Please note that some or all of this report was generated using voice recognition software. Please notify me in case of any questions about the content of this document, as some errors in transcription may have occurred .       Electronically signed by Johnathan Edwards MD , FACS, FASMBS  on 9/13/2022 at 2:16 PM

## 2022-09-13 NOTE — PATIENT INSTRUCTIONS
Patient received dietary handouts and education. Goals:   - Advance to phase 3 diet at 3weeks post op.    - Practice 30/30/30 - set utensil down, set timers, eat  - Ensure all food are pureed until 3 weeks post op   - Take 1 Fusion chewable at a time with food or protein shake

## 2022-09-13 NOTE — PROGRESS NOTES
Dietary Assessment Note      Vitals:   Vitals:    22 1324   BP: 118/70   Pulse: 97   Resp: 18   SpO2: 98%   Weight: 244 lb (110.7 kg)   Height: 5' 2\" (1.575 m)    Patient lost 22 lbs over 5 weeks. Pt and  present for visit today. Total Weight Loss: 41 lbs    Labs reviewed: labs are reviewed, up to date and normal    Protein intake: 60-80 grams/day     Fluid intake: 48-64 oz/day water/ gatorade zero    Multivitamin/mineral intake:  2 Fusion Chews at same time    Calcium intake: no    Other: no    Exercise:  walking    Nutrition Assessment: 2 weeks post-op visit. 2 Protein shakes made w/ 1% milk  Eating pureed foods 1 times/day: chix not always pureed, squash, broccoli, applesauce, ff refried beans     Amount able to eat per sittin oz     Following  rule: Eating over 5 minutes. Not      Food Intolerances/issues: none    Client Concerns: Taking large bite and eating quickly; know burping is sign of fullness      Handouts: 9 inch plate, cooking resources, ideal meal process, phase 3 diet    Goals:   - Advance to phase 3 diet at 3weeks post op.    - Practice  - set utensil down, set timers, eat  - Ensure all food are pureed until 3 weeks post op   - Take 1 Fusion chewable at a time with food or protein shake    Plan: Follow up at 6 weeks post op and as needed    Theresa Stephanie, RD, LD

## 2022-10-11 ENCOUNTER — OFFICE VISIT (OUTPATIENT)
Dept: BARIATRICS/WEIGHT MGMT | Age: 42
End: 2022-10-11

## 2022-10-11 VITALS
WEIGHT: 237 LBS | OXYGEN SATURATION: 98 % | BODY MASS INDEX: 43.61 KG/M2 | DIASTOLIC BLOOD PRESSURE: 74 MMHG | SYSTOLIC BLOOD PRESSURE: 108 MMHG | HEIGHT: 62 IN | HEART RATE: 65 BPM

## 2022-10-11 DIAGNOSIS — Z98.84 S/P LAPAROSCOPIC SLEEVE GASTRECTOMY: Primary | ICD-10-CM

## 2022-10-11 PROCEDURE — 99024 POSTOP FOLLOW-UP VISIT: CPT | Performed by: SURGERY

## 2022-10-11 NOTE — PROGRESS NOTES
Dietary Assessment Note  Vitals:   Vitals:    10/11/22 1442   BP: 108/74   Site: Left Upper Arm   Pulse: 65   SpO2: 98%   Weight: 237 lb (107.5 kg)   Height: 5' 2\" (1.575 m)    Patient lost 7 lbs over past ~4 weeks. Total Weight Loss: 48 lbs    Labs reviewed: no new labs    Protein intake: 60-80 grams/day     Fluid intake: 48-64 oz/day- water / gatorade zero w/ protein x2 / sf flavor pkts    Multivitamin/mineral intake: yes - 4 fusion    Calcium intake:  none    Other: OTC MVI / biotin    Exercise:  up moving around, no strenuous exercise / walking a lot a work & around the block    Nutrition Assessment: 6 week post-op visit.      B- protein shake  S- HB egg  L- 2oz chicken w/ lt conklin & egg  S- cucumber/tomato   D- same as lunch, maybe cucumbers OR refried beans  S- protein shake    Amount able to eat per sitting: ~1/2 cup     Following 30/30/30 rule: states this is hard, is  solids & liquids    Food Intolerances/issues: none    Client Concerns: none    Goals:   - Move to phase 4  - Track protein 60-80gm - may not always need protein shakes or protein water    Plan: f/u as directed    Mahi Macedo RD, LD

## 2022-10-11 NOTE — PROGRESS NOTES
[facility]) Physicians   [employer]  Zackery Nix MD, [facility], [address] [facility], [address] [facility]    [place] [address] . Phone: [phone]  Fax: [phone]       The patient is a 43 y.o. female who returns today for follow up.    Peggy Houston is s/p     Laparoscopic sleeve gastrectomy    We discussed how her weight affects her overall health including:  Patient Active Problem List   Diagnosis    Diabetes mellitus type 2 in obese ([address]    Morbid obesity with BMI of 45.0-49.9, adult [facility])    Essential hypertension    Chronic GERD    Observed sleep apnea    Mixed hyperlipidemia    Ventral hernia without obstruction or gangrene    S/P laparoscopic sleeve gastrectomy        Vitals:    10/11/22 1442   BP: 108/74   Site: Left Upper Arm   Pulse: 65   SpO2: 98%   Weight: 237 lb (107.5 kg)   Height: 5' 2\" (1.575 m)       Lab Results   Component Value Date/Time    WBC 12.5 08/30/2022 04:07 AM    RBC 3.88 08/30/2022 04:07 AM    HGB 11.4 08/30/2022 04:07 AM    HCT 34.8 08/30/2022 04:07 AM    MCV 89.7 08/30/2022 04:07 AM    MCH 29.5 08/30/2022 04:07 AM    MCHC 32.9 08/30/2022 04:07 AM    MPV 8.3 08/30/2022 04:07 AM    NEUTOPHILPCT 64.6 01/31/2020 08:06 AM    LYMPHOPCT 25 12/10/2021 09:13 AM    MONOPCT 6 12/10/2021 09:13 AM    MONOPCT 5.6 01/31/2020 08:06 AM    EOSRELPCT 3 12/10/2021 09:13 AM    BASOPCT 0.7 01/31/2020 08:06 AM    NEUTROABS 5.40 12/10/2021 09:13 AM    LYMPHSABS 2.10 12/10/2021 09:13 AM    MONOSABS 0.50 12/10/2021 09:13 AM    EOSABS 0.20 12/10/2021 09:13 AM     Lab Results   Component Value Date/Time     08/30/2022 04:07 AM    K 3.7 08/30/2022 04:07 AM     08/30/2022 04:07 AM    CO2 25 08/30/2022 04:07 AM    ANIONGAP 8 08/30/2022 04:07 AM    GLUCOSE 118 08/30/2022 04:07 AM    BUN 6 08/30/2022 04:07 AM    CREATININE <0.5 08/30/2022 04:07 AM    LABGLOM >60 08/30/2022 04:07 AM    GFRAA >60 08/30/2022 04:07 AM    CALCIUM 8.6 08/30/2022 04:07 AM    PROT 7.1 12/10/2021 09:13 AM    LABALBU 3.9 12/10/2021 09:13 AM    AGRATIO 1.3 01/31/2020 08:06 AM    BILITOT 0.5 12/10/2021 09:13 AM    ALKPHOS 67 12/10/2021 09:13 AM    ALT 18 12/10/2021 09:13 AM    AST 18 12/10/2021 09:13 AM    GLOB 2.9 01/31/2020 08:06 AM     Lab Results   Component Value Date/Time    CHOL 110 12/10/2021 09:13 AM    TRIG 96 12/10/2021 09:13 AM    HDL 41 12/10/2021 09:13 AM    LDLCALC 50 12/10/2021 09:13 AM    LABVLDL 26 01/31/2020 08:06 AM     Lab Results   Component Value Date/Time    TSHREFLEX 17.12 01/31/2020 08:06 AM     Lab Results   Component Value Date/Time    IRON 70 12/10/2021 09:13 AM    TIBC 332 12/10/2021 09:13 AM    LABIRON 21 12/10/2021 09:13 AM     Lab Results   Component Value Date/Time    GFAQYFNO08 516 12/10/2021 09:13 AM    FOLATE 11.16 01/31/2020 08:06 AM     Lab Results   Component Value Date/Time    VITD25 43.1 12/10/2021 09:13 AM     Lab Results   Component Value Date/Time    LABA1C 8.4 12/10/2021 09:13 AM     12/10/2021 09:13 AM        The patient's current Body mass index is 43.35 kg/m². (10/11/22). Since her last visit she has lost 7 lbs since last visit and total of 48 lbs. Maegan John underwent dietary counseling, and I have reviewed and agree with the dietary counseling, and I have reviewed and agree with the diet plan. There are no changes in the patients medical history or physical exam.     Denies nausea, vomiting, fevers, chills, hiccups, shoulder pain, heartburn, dysphagia wound drainage/bulge nor change in color around incision. Bowels working ok and making urine. The incisions healing well. Overall I'm really pleased with Maegan John recovery. Pathology results were discussed with the patient. aMegan John advised to sign  release form for utilizing the 3 months complimentary membership in the [address] [place] [address] starting after 6 weeks post op.     I did explain thoroughly to the patient that compliance with  post op diet and other recommendations are integral part to improve the chances of successful weight loss and also not following it could end with serious health complications. I advised Anyi Anderson to gradually advance activity and  to call if there are any questions or concerns. Anyi Anderson will follow up in 8 weeks. Please note that some or all of this report was generated using voice recognition software. Please notify me in case of any questions about the content of this document, as some errors in transcription may have occurred .       Electronically signed by Jess Rubin MD , FACS, FASMBS  on 10/11/2022 at 3:22 PM

## 2022-11-29 ENCOUNTER — HOSPITAL ENCOUNTER (OUTPATIENT)
Dept: WOMENS IMAGING | Age: 42
Discharge: HOME | End: 2022-11-29
Payer: COMMERCIAL

## 2022-11-29 VITALS — BODY MASS INDEX: 47.84 KG/M2 | HEIGHT: 62 IN | WEIGHT: 260 LBS

## 2022-11-29 DIAGNOSIS — Z12.31 BREAST CANCER SCREENING BY MAMMOGRAM: ICD-10-CM

## 2022-11-29 PROCEDURE — 77067 SCR MAMMO BI INCL CAD: CPT

## 2022-12-05 ENCOUNTER — TELEPHONE (OUTPATIENT)
Dept: WOMENS IMAGING | Age: 42
End: 2022-12-05

## 2022-12-22 ENCOUNTER — HOSPITAL ENCOUNTER (OUTPATIENT)
Dept: WOMENS IMAGING | Age: 42
Discharge: HOME | End: 2022-12-22
Payer: COMMERCIAL

## 2022-12-22 ENCOUNTER — HOSPITAL ENCOUNTER (OUTPATIENT)
Dept: ULTRASOUND IMAGING | Age: 42
Discharge: HOME | End: 2022-12-22
Payer: COMMERCIAL

## 2022-12-22 DIAGNOSIS — R92.8 ABNORMAL MAMMOGRAM: ICD-10-CM

## 2022-12-22 PROCEDURE — G0279 TOMOSYNTHESIS, MAMMO: HCPCS

## 2022-12-22 PROCEDURE — 76642 ULTRASOUND BREAST LIMITED: CPT

## 2023-01-31 ENCOUNTER — OFFICE VISIT (OUTPATIENT)
Dept: BARIATRICS/WEIGHT MGMT | Age: 43
End: 2023-01-31
Payer: COMMERCIAL

## 2023-01-31 VITALS
OXYGEN SATURATION: 98 % | HEART RATE: 62 BPM | DIASTOLIC BLOOD PRESSURE: 64 MMHG | WEIGHT: 216.8 LBS | SYSTOLIC BLOOD PRESSURE: 122 MMHG | RESPIRATION RATE: 18 BRPM | HEIGHT: 62 IN | BODY MASS INDEX: 39.9 KG/M2

## 2023-01-31 DIAGNOSIS — E11.69 DIABETES MELLITUS TYPE 2 IN OBESE (HCC): ICD-10-CM

## 2023-01-31 DIAGNOSIS — E78.2 MIXED HYPERLIPIDEMIA: ICD-10-CM

## 2023-01-31 DIAGNOSIS — G47.30 OBSERVED SLEEP APNEA: ICD-10-CM

## 2023-01-31 DIAGNOSIS — E66.01 MORBID OBESITY WITH BMI OF 45.0-49.9, ADULT (HCC): ICD-10-CM

## 2023-01-31 DIAGNOSIS — K21.9 CHRONIC GERD: ICD-10-CM

## 2023-01-31 DIAGNOSIS — E66.9 DIABETES MELLITUS TYPE 2 IN OBESE (HCC): ICD-10-CM

## 2023-01-31 DIAGNOSIS — I10 ESSENTIAL HYPERTENSION: ICD-10-CM

## 2023-01-31 DIAGNOSIS — Z98.84 S/P LAPAROSCOPIC SLEEVE GASTRECTOMY: Primary | ICD-10-CM

## 2023-01-31 PROCEDURE — 99214 OFFICE O/P EST MOD 30 MIN: CPT | Performed by: SURGERY

## 2023-01-31 PROCEDURE — 3074F SYST BP LT 130 MM HG: CPT | Performed by: SURGERY

## 2023-01-31 PROCEDURE — 3078F DIAST BP <80 MM HG: CPT | Performed by: SURGERY

## 2023-01-31 NOTE — PROGRESS NOTES
[facility]) Physicians   [employer]  Dimitry Lepe MD, [facility], [address] [facility] [address], [address] [facility]    [place]  [address] . Phone: [phone]  Fax: [phone]            Chief Complaint   Patient presents with    Bariatrics Post Op Follow Up     22 wk s/p sleeve 8/29/22           HPI:    Val Neri is a very pleasant 43 y.o.  female , Body mass index is 39.65 kg/m². Tanda Bun And multiple medical problems who is presenting for bariatric follow up care. Alexandra Shaffer is s/p laparoscopic sleeve gastrectomy by me 8/2022. Initial Weight: 285 lbs, Weight Loss: 217 lbs. Comes today to the clinic without any complaints. Patient denies any nausea, vomiting, fevers, chills, shortness of breath, chest pain, constipation or urinary symptoms. Denies any heartburn nor dysphagia. Patient informed us today that they are not taking the multivitamins as instructed. Patient denies any tingling, weakness,  numbness nor any neurological symptoms. Alexandra Shaffer is feeling very well, and is very active. Patient is very pleased with the weight loss and resolution of co-morbid conditions.       Pain Assessment   Denies any abdominal pain     Past Medical History:   Diagnosis Date    Abdominal hernia     Anxiety     Chronic GERD 1/30/2020    Depression     Diabetes mellitus type 2 in obese ([facility] 1/30/2020    Essential hypertension 1/30/2020    Hyperlipidemia     Morbid obesity with BMI of 50.0-59.9, adult ([facility] 1/30/2020    Obstructive sleep apnea 1/30/2020    Thyroid disease      Past Surgical History:   Procedure Laterality Date    SLEEVE GASTRECTOMY N/A 8/29/2022    LAPAROSCOPIC SLEEVE GASTRECTOMY-ETHICON/MEDTRONIC performed by Jason Menezes MD at . [address] N/A 2/11/2022    EGD BIOPSY performed by Jason Menezes MD at [facility] 43 N/A 8/29/2022    LAPAROSCOPIC VENTRAL HERNIA  REPAIR performed by Jason Menezes MD at [address] [facility] [facility] Problem Relation Age of Onset    Hypertension Mother     Stroke Mother     Elevated Lipids Mother     Mental Illness Mother     Hypertension Father     Stroke Father     Elevated Lipids Father     Diabetes Father     Mental Illness Father      Social History     Tobacco Use    Smoking status: Never    Smokeless tobacco: Never   Substance Use Topics    Alcohol use: Not Currently     I counseled the patient on the importance of not smoking and risks of ETOH. Allergies   Allergen Reactions    Other Anaphylaxis     EGGPLANT    Amoxicillin      Side effect - yeast infection    Prozac [Fluoxetine Hcl]      Left arm numbness     Vitals:    01/31/23 0805   BP: 122/64   Pulse: 62   Resp: 18   SpO2: 98%   Weight: 216 lb 12.8 oz (98.3 kg)   Height: 5' 2\" (1.575 m)       Body mass index is 39.65 kg/m².       Lab Results   Component Value Date/Time    WBC 12.5 08/30/2022 04:07 AM    RBC 3.88 08/30/2022 04:07 AM    HGB 11.4 08/30/2022 04:07 AM    HCT 34.8 08/30/2022 04:07 AM    MCV 89.7 08/30/2022 04:07 AM    MCH 29.5 08/30/2022 04:07 AM    MCHC 32.9 08/30/2022 04:07 AM    MPV 8.3 08/30/2022 04:07 AM    NEUTOPHILPCT 64.6 01/31/2020 08:06 AM    LYMPHOPCT 25 12/10/2021 09:13 AM    MONOPCT 6 12/10/2021 09:13 AM    MONOPCT 5.6 01/31/2020 08:06 AM    EOSRELPCT 3 12/10/2021 09:13 AM    BASOPCT 0.7 01/31/2020 08:06 AM    NEUTROABS 5.40 12/10/2021 09:13 AM    LYMPHSABS 2.10 12/10/2021 09:13 AM    MONOSABS 0.50 12/10/2021 09:13 AM    EOSABS 0.20 12/10/2021 09:13 AM     Lab Results   Component Value Date/Time     08/30/2022 04:07 AM    K 3.7 08/30/2022 04:07 AM     08/30/2022 04:07 AM    CO2 25 08/30/2022 04:07 AM    ANIONGAP 8 08/30/2022 04:07 AM    GLUCOSE 118 08/30/2022 04:07 AM    BUN 6 08/30/2022 04:07 AM    CREATININE <0.5 08/30/2022 04:07 AM    LABGLOM >60 08/30/2022 04:07 AM    GFRAA >60 08/30/2022 04:07 AM    CALCIUM 8.6 08/30/2022 04:07 AM    PROT 7.1 12/10/2021 09:13 AM    LABALBU 3.9 12/10/2021 09:13 AM AGRATIO 1.3 01/31/2020 08:06 AM    BILITOT 0.5 12/10/2021 09:13 AM    ALKPHOS 67 12/10/2021 09:13 AM    ALT 18 12/10/2021 09:13 AM    AST 18 12/10/2021 09:13 AM    GLOB 2.9 01/31/2020 08:06 AM     Lab Results   Component Value Date/Time    CHOL 110 12/10/2021 09:13 AM    TRIG 96 12/10/2021 09:13 AM    HDL 41 12/10/2021 09:13 AM    LDLCALC 50 12/10/2021 09:13 AM    LABVLDL 26 01/31/2020 08:06 AM     Lab Results   Component Value Date/Time    TSHREFLEX 17.12 01/31/2020 08:06 AM     Lab Results   Component Value Date/Time    IRON 70 12/10/2021 09:13 AM    TIBC 332 12/10/2021 09:13 AM    LABIRON 21 12/10/2021 09:13 AM     Lab Results   Component Value Date/Time    YBNTFMLF74 516 12/10/2021 09:13 AM    FOLATE 11.16 01/31/2020 08:06 AM     Lab Results   Component Value Date/Time    VITD25 43.1 12/10/2021 09:13 AM     Lab Results   Component Value Date/Time    LABA1C 8.4 12/10/2021 09:13 AM     12/10/2021 09:13 AM         Current Outpatient Medications:     ondansetron (ZOFRAN ODT) 4 MG disintegrating tablet, Take 2 tablets by mouth every 8 hours as needed for Nausea, Disp: 30 tablet, Rfl: 0    ondansetron (ZOFRAN ODT) 4 MG disintegrating tablet, Take 2 tablets by mouth every 8 hours as needed for Nausea, Disp: 30 tablet, Rfl: 2    LEVEMIR FLEXTOUCH 100 UNIT/ML injection pen, , Disp: , Rfl:     [employer] PEN NEEDLES 31G X 5 MM MISC, , Disp: , Rfl:     lisinopril (PRINIVIL;ZESTRIL) 10 MG tablet, Take 10 mg by mouth in the morning., Disp: , Rfl:     oxybutynin (DITROPAN-XL) 10 MG extended release tablet, Take 10 mg by mouth in the morning., Disp: , Rfl:     omeprazole (PRILOSEC) 20 MG delayed release capsule, Take 1 capsule by mouth Daily, Disp: 30 capsule, Rfl: 3    amphetamine-dextroamphetamine (ADDERALL XR) 10 MG extended release capsule, Take 10 mg by mouth every morning., Disp: , Rfl:     glipiZIDE (GLUCOTROL) 5 MG tablet, Take 5 mg by mouth 2 times daily (before meals), Disp: , Rfl:     metFORMIN (GLUCOPHAGE) 1000 MG tablet, Take 1,000 mg by mouth in the morning and 1,000 mg in the evening. Take before meals. , Disp: , Rfl:     pioglitazone (ACTOS) 30 MG tablet, Take 30 mg by mouth in the morning., Disp: , Rfl:     sertraline (ZOLOFT) 100 MG tablet, Take 100 mg by mouth, Disp: , Rfl:     levothyroxine (SYNTHROID) 175 MCG tablet, Take 175 mcg by mouth, Disp: , Rfl:     atorvastatin (LIPITOR) 20 MG tablet, Take 20 mg by mouth at bedtime, Disp: , Rfl:     valACYclovir (VALTREX) 500 MG tablet, Take 500 mg by mouth in the morning., Disp: , Rfl:     fexofenadine (ALLEGRA) 180 MG tablet, Take 180 mg by mouth in the morning., Disp: , Rfl:       Review of Systems - History obtained from the patient  General ROS: negative  Psychological ROS: negative  Ophthalmic ROS: negative  Neurological ROS: negative  ENT ROS: negative  Allergy and Immunology ROS: negative  Hematological and Lymphatic ROS: negative  Endocrine ROS: negative  Breast ROS: negative  Respiratory ROS: negative  Cardiovascular ROS: negative  Gastrointestinal ROS:negative  Genito-Urinary ROS: negative  Musculoskeletal ROS: negative   Skin ROS: negative    Physical Exam   Vitals Reviewed   Constitutional: Patient is oriented to person, place, and time. Patient appears well-developed and well-nourished. Patient is active and cooperative. Non-toxic appearance. No distress. HENT:   Head: Normocephalic and atraumatic. Head is without abrasion and without laceration. Hair is normal.   Right Ear: External ear normal. No lacerations. No drainage, swelling . Left Ear: External ear normal. No lacerations. No drainage, swelling. Mouth / Nose: face mask in place  Eyes: Conjunctivae, EOM and lids are normal. Right eye exhibits no discharge. No foreign body present in the right eye. Left eye exhibits no discharge. No foreign body present in the left eye. No scleral icterus. Neck: Trachea normal and normal range of motion. No JVD present.    Pulmonary/Chest: Effort normal. No accessory muscle usage or stridor. No apnea. No respiratory distress. Cardiovascular: Normal rate and no JVD. Abdominal: Normal appearance. Patient exhibits no distension. Abdomen is soft, obese, non tender. Musculoskeletal: Normal range of motion. Patient exhibits no edema. Neurological: Patient is alert and oriented to person, place, and time. Patient has normal strength. GCS eye subscore is 4. GCS verbal subscore is 5. GCS motor subscore is 6. Skin: Skin is warm and dry. No abrasion and no rash noted. Patient is not diaphoretic. No cyanosis or erythema. Psychiatric: Patient has a normal mood and affect. Speech is normal and behavior is normal. Cognition and memory are normal.       A/P:    Chester Adams was seen today for bariatrics post op follow up. Diagnoses and all orders for this visit:    S/P laparoscopic sleeve gastrectomy  -     CBC with Auto Differential; Future  -     Comprehensive Metabolic Panel; Future  -     Hemoglobin A1C; Future  -     Iron and TIBC; Future  -     Lipid Panel; Future  -     TSH with Reflex; Future  -     Vitamin A; Future  -     Vitamin B1, Whole Blood; Future  -     Vitamin B12 & Folate; Future  -     Vitamin D 25 Hydroxy; Future  -     Vitamin E; Future  -     Protime-INR; Future    Diabetes mellitus type 2 in obese (HCC)  -     CBC with Auto Differential; Future  -     Comprehensive Metabolic Panel; Future  -     Hemoglobin A1C; Future  -     Iron and TIBC; Future  -     Lipid Panel; Future  -     TSH with Reflex; Future  -     Vitamin A; Future  -     Vitamin B1, Whole Blood; Future  -     Vitamin B12 & Folate; Future  -     Vitamin D 25 Hydroxy; Future  -     Vitamin E; Future  -     Protime-INR; Future    Morbid obesity with BMI of 45.0-49.9, adult (HCC)  -     CBC with Auto Differential; Future  -     Comprehensive Metabolic Panel; Future  -     Hemoglobin A1C; Future  -     Iron and TIBC; Future  -     Lipid Panel; Future  -     TSH with Reflex;  Future  - Vitamin A; Future  -     Vitamin B1, Whole Blood; Future  -     Vitamin B12 & Folate; Future  -     Vitamin D 25 Hydroxy; Future  -     Vitamin E; Future  -     Protime-INR; Future    Essential hypertension  -     CBC with Auto Differential; Future  -     Comprehensive Metabolic Panel; Future  -     Hemoglobin A1C; Future  -     Iron and TIBC; Future  -     Lipid Panel; Future  -     TSH with Reflex; Future  -     Vitamin A; Future  -     Vitamin B1, Whole Blood; Future  -     Vitamin B12 & Folate; Future  -     Vitamin D 25 Hydroxy; Future  -     Vitamin E; Future  -     Protime-INR; Future    Chronic GERD  -     CBC with Auto Differential; Future  -     Comprehensive Metabolic Panel; Future  -     Hemoglobin A1C; Future  -     Iron and TIBC; Future  -     Lipid Panel; Future  -     TSH with Reflex; Future  -     Vitamin A; Future  -     Vitamin B1, Whole Blood; Future  -     Vitamin B12 & Folate; Future  -     Vitamin D 25 Hydroxy; Future  -     Vitamin E; Future  -     Protime-INR; Future    Observed sleep apnea  -     CBC with Auto Differential; Future  -     Comprehensive Metabolic Panel; Future  -     Hemoglobin A1C; Future  -     Iron and TIBC; Future  -     Lipid Panel; Future  -     TSH with Reflex; Future  -     Vitamin A; Future  -     Vitamin B1, Whole Blood; Future  -     Vitamin B12 & Folate; Future  -     Vitamin D 25 Hydroxy; Future  -     Vitamin E; Future  -     Protime-INR; Future    Mixed hyperlipidemia  -     CBC with Auto Differential; Future  -     Comprehensive Metabolic Panel; Future  -     Hemoglobin A1C; Future  -     Iron and TIBC; Future  -     Lipid Panel; Future  -     TSH with Reflex; Future  -     Vitamin A; Future  -     Vitamin B1, Whole Blood; Future  -     Vitamin B12 & Folate; Future  -     Vitamin D 25 Hydroxy; Future  -     Vitamin E; Future  -     Protime-INR; Future        Gar Oms is 43 y.o. female , now with Body mass index is 39.65 kg/m².   s/p Sleeve gastrectomy, has lost 20 lbs since last visit, total of 68 lbs weight loss. The patient underwent dietary counseling with registered dietician. I have reviewed, discussed and agree with the dietary plan. Patient is trying hard to keep good dietary and behavior modifications. Patient is monitoring portion sizes, food choices and liquid calories. Patient is trying to exercise regularly. Patient pleased with the surgery outcomes. I encouraged the patient to continue exercise and keeping healthy eating habits. Also counseled the patient extensively on post surgery care. Total encounter time: 32 minutes, including any number of the following: Bariatric Post operative work up/protocols, review of labs, imaging, provider notes, outside hospital records, performing examination/evaluation, counseling patient and/or family, ordering medications/tests, placing referrals and communication with referring physicians, coordination of care; discussing dietary plan/recall with the patient as well with registered dietitian and documentation in the EHR. Of note, the above was done during same day of the actual patient encounter. Hilda Braswell is here for her 5-month status post sleeve gastrectomy. Patient has been under a lot of stress. Patient is also not taking a multivitamin. I encouraged the patient to stay the course and continue to focus on her health. Hilda Braswell will benefit from seeing our Aman Pert, to work on behavioral aspects / changes to help with weight loss / maintenance. I did explain thoroughly to the patient that compliance with pre- and post op diet and other recommendations are integral part to improve the chances of successful weight loss and also not following it could end with serious health complications. Some strategies discussed today include but not limited to : 30/30/30 minutes rule, food diary, avoid fast food and packing/planing ahead, & increasing exercise.     Also stressed to the patient importance of taking the multivitamins as instructed, otherwise risk significant complications. Obesity as a disease is considered a high risk to patients overall health and should therefore be considered a high risk disease state. Advised the patient that not getting there weight under control, which hopefully would help with getting some of the comorbidities under control. That could increase risk of complications/worsening of those conditions on the long-term. Now with Covid-19 pandemic, CDC and health authorities does classify obese patients as vulnerable and high risk as well. Which makes weight loss a priority for improvement of their wellbeing and overall health. We discussed how her excess weight affects her overall health and importance of weight loss, healthy diet and active lifestyle to alleviate those co morbid conditions, otherwise risk deterioration.       - RTC in 2 months  - Nutrition labs         Patient advised that its their responsibility to follow up for care, studies, referrals and/or labs ordered today. Please note that some or all of this report was generated using voice recognition software. Please notify me in case of any questions about the content of this document, as some errors in transcription may have occurred .

## 2023-01-31 NOTE — PROGRESS NOTES
Dietary Assessment Note      Vitals:   Vitals:    23 0805   Height: 5' 2\" (1.575 m)    Patient lost 20 lbs over 16 weeks.    Total Weight Loss: 68 lbs    Labs reviewed: no lab studies available for review at time of visit    Protein intake: 60-80 grams/day     Fluid intake: 48-64 oz/day- water / gatorade zero w/ protein x2 / sf flavor pkts  Pt has a few sips soda once in awhile.       Multivitamin/mineral intake: No.   4 fusion and ran out a few months ago. Has been taking OTC generic women's MVI 1 per day.     Calcium intake:   None     Other:   Biotin     Exercise:   walking around the block 3-4 times per week for 20-30 minutes (1 mile)    Nutrition Assessment: 22 post-op visit.   Pt is eating 3 times per day. Spouse reports pt having \"Cravings for fried foods in the evening\". Pt reports symptoms of cravings occur when spouse took her out to a restaurant for fried calamari. Prior to restaurant pt reports had not eaten in several hours. Discussed this is likely not cravings, rather hunger and environmental triggers  Breakfast: protein shake  1-2 Snacks: atkins bar OR cheese stick and pc of fruit  Lunch: canned chix and HB egg  Dinner:  chix grilled or meat with broccoli.     Amount able to eat per sittin/2 to 3/4 c    Following  rule: 20-30 minutes  Waiting 30 minutes before and after meals.  Taking a few sips with meals.    Food Intolerances/issues: none    Client Concerns: conflict with spouse who came with her to appointment.     Goals:   Protein shake/bar handout- what to look for on the label.   start fusion MVI capsule with iron and calcium chews - reviewed MVI alternative handout  Meet a behaviorist or counselor regarding family dynamics     Yaritza Villanueva, MS, RD, LD

## 2023-04-25 ENCOUNTER — TELEPHONE (OUTPATIENT)
Dept: BARIATRICS/WEIGHT MGMT | Age: 43
End: 2023-04-25

## 2024-02-14 LAB
CHLAMYDIA TRACHOMATIS AMPLIFIED DET: NOT DETECTED
HB: SOURCE: NORMAL
N GONORRHOEAE AMPLIFIED DET: NOT DETECTED
SPECIMEN TYPE: NORMAL

## 2024-03-26 ENCOUNTER — OFFICE VISIT (OUTPATIENT)
Dept: BARIATRICS/WEIGHT MGMT | Age: 44
End: 2024-03-26
Payer: COMMERCIAL

## 2024-03-26 VITALS
HEART RATE: 100 BPM | DIASTOLIC BLOOD PRESSURE: 62 MMHG | SYSTOLIC BLOOD PRESSURE: 118 MMHG | OXYGEN SATURATION: 99 % | RESPIRATION RATE: 18 BRPM | BODY MASS INDEX: 38.24 KG/M2 | HEIGHT: 62 IN | WEIGHT: 207.8 LBS

## 2024-03-26 DIAGNOSIS — Z98.84 S/P LAPAROSCOPIC SLEEVE GASTRECTOMY: Primary | ICD-10-CM

## 2024-03-26 DIAGNOSIS — E78.2 MIXED HYPERLIPIDEMIA: ICD-10-CM

## 2024-03-26 PROBLEM — K21.9 CHRONIC GERD: Status: RESOLVED | Noted: 2020-01-30 | Resolved: 2024-03-26

## 2024-03-26 PROCEDURE — 3078F DIAST BP <80 MM HG: CPT | Performed by: SURGERY

## 2024-03-26 PROCEDURE — 99214 OFFICE O/P EST MOD 30 MIN: CPT | Performed by: SURGERY

## 2024-03-26 PROCEDURE — 3074F SYST BP LT 130 MM HG: CPT | Performed by: SURGERY

## 2024-03-26 NOTE — PROGRESS NOTES
Dietary Assessment Note      Vitals:   Vitals:    24 1304   Resp: 18   Height: 1.575 m (5' 2\")    Patient lost 9 lbs over 2 mos.    Total Weight Loss: 77 lbs    Labs reviewed:    Latest Reference Range & Units 23 06:36   Creatinine 0.60 - 1.20 mg/dL 0.57 (L)   (L): Data is abnormally low   Latest Reference Range & Units 23 06:40   HDL Cholesterol >50 mg/dL 42 (L)   (L): Data is abnormally low    Protein intake: 60-80 grams/day     Fluid intake: 48-64 oz/day powerade zero     Multivitamin/mineral intake: yes womens 1 a day    Calcium intake: yes 2 chews/ day    Other: biotin    Exercise: yes walking 1 mile daily    Nutrition Assessment: ~1 yr 7 mos post-op visit. Here for skin removal  B: egg bites + protein shake  L: protein bar   D:chicken/beef/beans + veg     Amount able to eat per sittin/2 to 3/4 c     Following  rule: yes, takes some sips with meals    Food Intolerances/issues: none    Client Concerns: none    Goals:   - Take 2 alternative MVI per day to meet needs  - Continue diet and exercise    Plan: f/u per provider    DEEPTHI LINDO, MS, RD, LD  
encouraged the patient to continue exercise and keeping healthy eating habits. Also counseled the patient extensively on post surgery care.           Total encounter time: 31 minutes, including any number of the following: Bariatric Post operative work up/protocols, review of labs, imaging, provider notes, outside hospital records, performing examination/evaluation, counseling patient and/or family, ordering medications/tests, placing referrals and communication with referring physicians, coordination of care; discussing dietary plan/recall with the patient as well with registered dietitian and documentation in the EHR. Of note, the above was done during same day of the actual patient encounter.            Kanika is here for her 1-1/2 years status post sleeve gastrectomy.  Patient has been seen since her 5 months visit.  Patient weight has been much been stable.  Wants to discuss excess skin removal.  Patient is very happy with her BMI and her weight loss and wants to maintain.      I did explain thoroughly to the patient that compliance with pre- and post op diet and other recommendations are integral part to improve the chances of successful weight loss and also not following it could end with serious health complications.   Some strategies discussed today include but not limited to : 30/30/30 minutes rule, food diary, avoid fast food and packing/planing ahead, & increasing exercise.    Also stressed to the patient importance of taking the multivitamins as instructed, otherwise risk significant complications.    Obesity as a disease is considered a high risk to patients overall health and should therefore be considered a high risk disease state.   Advised the patient that not getting there weight under control, which hopefully would help with getting some of the comorbidities under control. That could increase risk of complications/worsening of those conditions on the long-term.  During Covid-19 pandemic, CDC and

## 2024-05-29 ENCOUNTER — OFFICE VISIT (OUTPATIENT)
Dept: SURGERY | Age: 44
End: 2024-05-29
Payer: COMMERCIAL

## 2024-05-29 VITALS
OXYGEN SATURATION: 98 % | HEIGHT: 62 IN | TEMPERATURE: 98.2 F | DIASTOLIC BLOOD PRESSURE: 79 MMHG | WEIGHT: 208 LBS | BODY MASS INDEX: 38.28 KG/M2 | SYSTOLIC BLOOD PRESSURE: 124 MMHG | HEART RATE: 88 BPM

## 2024-05-29 DIAGNOSIS — M79.3 PANNICULITIS: Primary | ICD-10-CM

## 2024-05-29 PROCEDURE — 99204 OFFICE O/P NEW MOD 45 MIN: CPT

## 2024-05-29 PROCEDURE — 3074F SYST BP LT 130 MM HG: CPT

## 2024-05-29 PROCEDURE — 3078F DIAST BP <80 MM HG: CPT

## 2024-05-29 NOTE — PROGRESS NOTES
[facility]    Consultation requested by: Bonita Mendes MD.     CC: Body contouring    HPI: 44 y.o. female with a PMHx as delineated below who presents in consultation for body contouring. Since patient has lost over a hundred pounds she has increased skin irritation and rashes. She has been prescribed nystatin from her PCP which helps a little but doesn't completley resolve condition.      Bariatric surgery: Yes / date: 2022 (Type: sleeve gastrectomy)    Max weight (lbs): 315  Lowest weight (lbs): 208  Current (lbs): 208  Weight change in the past 3 months: No  Max BMI: 57.6  Current BMI: Body mass index is 38.04 kg/m².    History of DVT/PE: No  Excess skin cover genitals: Yes    Previous body contouring procedures: No   Smoking: No    Pregnancy / Miscarriage: 0/0    Past Medical History:   Diagnosis Date    Abdominal hernia     Anxiety     Chronic GERD 1/30/2020    Depression     Diabetes mellitus type 2 in obese 1/30/2020    Essential hypertension 1/30/2020    Hyperlipidemia     Morbid obesity with BMI of 50.0-59.9, adult (HCC) 1/30/2020    Obstructive sleep apnea 1/30/2020    Thyroid disease    Last Hemoglobin A1C 5.6 done 2/13/23    Past Surgical History:   Procedure Laterality Date    SLEEVE GASTRECTOMY N/A 8/29/2022    LAPAROSCOPIC SLEEVE GASTRECTOMY-ETHICON/MEDTRONIC performed by Bonita Mendes MD at [facility] OR    UPPER GASTROINTESTINAL ENDOSCOPY N/A 2/11/2022    EGD BIOPSY performed by Bonita Mendes MD at [facility] ASC ENDOSCOPY    VENTRAL HERNIA REPAIR N/A 8/29/2022    LAPAROSCOPIC VENTRAL HERNIA  REPAIR performed by Bonita Mendes MD at [facility] OR     Social History     Socioeconomic History    Marital status:      Spouse name: Not on file    Number of children: Not on file    Years of education: Not on file    Highest education level: Not on file   Occupational History    Not on file   Tobacco Use    Smoking status: Never    Smokeless tobacco: Never   Vaping Use    Vaping

## 2024-06-28 NOTE — PATIENT INSTRUCTIONS
Patient received dietary handouts and education.     Goals:   - Move to phase 4  - Track protein 60-80gm - may not always need protein shakes or protein water Read the attached caretaking information.  You may take Tylenol/acetaminophen and/or Advil/Motrin/ibuprofen as directed as needed for pain relief.  Apply cold compresses and ice to your areas of pain for further relief.  I recommend wearing supportive underwear for further relief.  Try to avoid any particular activities at this time that may increase your pain significantly.  Please follow-up with your primary care provider as soon as possible for further evaluation and management of your symptoms and health.  Closely monitor symptoms.  Return to the emergency department immediately if you experience any new, persistent, or worsening symptoms or if you have any further concerns.

## 2024-07-02 ENCOUNTER — OFFICE VISIT (OUTPATIENT)
Dept: SURGERY | Age: 44
End: 2024-07-02
Payer: COMMERCIAL

## 2024-07-02 VITALS
BODY MASS INDEX: 37.5 KG/M2 | DIASTOLIC BLOOD PRESSURE: 73 MMHG | WEIGHT: 203.8 LBS | OXYGEN SATURATION: 98 % | TEMPERATURE: 98.2 F | RESPIRATION RATE: 16 BRPM | HEART RATE: 73 BPM | SYSTOLIC BLOOD PRESSURE: 109 MMHG | HEIGHT: 62 IN

## 2024-07-02 DIAGNOSIS — M79.3 PANNICULITIS: Primary | ICD-10-CM

## 2024-07-02 PROCEDURE — 3074F SYST BP LT 130 MM HG: CPT

## 2024-07-02 PROCEDURE — 99213 OFFICE O/P EST LOW 20 MIN: CPT

## 2024-07-02 PROCEDURE — 3078F DIAST BP <80 MM HG: CPT

## 2024-07-02 NOTE — PROGRESS NOTES
[facility]    Consultation requested by: Bonita Mendes MD.     CC: Body contouring    HPI: 44 y.o. female with a PMHx as delineated below who presents in consultation for body contouring. Since patient has lost over a hundred pounds she has increased skin irritation and rashes. She has been prescribed nystatin from her PCP which helps a little but doesn't completley resolve condition.      Since her last evaluation the patient has been continuing to work on her diet and exercise and has lost 5 additional pounds.     Bariatric surgery: Yes / date: 2022 (Type: sleeve gastrectomy)    Max weight (lbs): 315  Lowest weight (lbs): 203 (208)  Current (lbs): 203 (208)  Weight change in the past 3 months: No  Max BMI: 57.6  Current BMI: 37.27    History of DVT/PE: No  Excess skin cover genitals: Yes    Previous body contouring procedures: No   Smoking: No    Pregnancy / Miscarriage: 0/0    Past Medical History:   Diagnosis Date    Abdominal hernia     Anxiety     Chronic GERD 1/30/2020    Depression     Diabetes mellitus type 2 in obese 1/30/2020    Essential hypertension 1/30/2020    Hyperlipidemia     Morbid obesity with BMI of 50.0-59.9, adult (HCC) 1/30/2020    Obstructive sleep apnea 1/30/2020    Thyroid disease    Last Hemoglobin A1C 5.6 done 2/13/23    Past Surgical History:   Procedure Laterality Date    SLEEVE GASTRECTOMY N/A 8/29/2022    LAPAROSCOPIC SLEEVE GASTRECTOMY-ETHICON/MEDTRONIC performed by Bonita Mendes MD at [facility] OR    UPPER GASTROINTESTINAL ENDOSCOPY N/A 2/11/2022    EGD BIOPSY performed by Bonita Mendes MD at [facility] ASC ENDOSCOPY    VENTRAL HERNIA REPAIR N/A 8/29/2022    LAPAROSCOPIC VENTRAL HERNIA  REPAIR performed by Bonita Mendes MD at [facility] OR     Social History     Socioeconomic History    Marital status:      Spouse name: Not on file    Number of children: Not on file    Years of education: Not on file    Highest education level: Not on file   Occupational

## 2024-09-17 NOTE — PROGRESS NOTES
[facility]     Consultation requested by: Bonita Mendes MD.      CC: Body contouring     HPI: 44 y.o. female with a PMHx as delineated below who presents in consultation for body contouring. Since patient has lost over a hundred pounds she has increased skin irritation and rashes. She has been prescribed nystatin from her PCP which helps a little but doesn't completley resolve condition. The patient has been continuing to work on her diet and exercise and has lost 5 additional pounds.      Since her last evaluation with Diana Alfredo, she has lost 3 lbs.     Bariatric surgery: Yes / date: 2022  (Type: sleeve gastrectomy)     Max weight (lbs): 315  Lowest weight (lbs): 203  Current (lbs): 200 (203, 208)  Weight change in the past 3 months: No  Max BMI: 57.6  Current BMI: 36.6 (37.27)     History of DVT/PE: No  Excess skin cover genitals: Yes     Previous body contouring procedures: No             Smoking: No                Pregnancy / Miscarriage: 0/0     Past Medical History        Past Medical History:   Diagnosis Date    Abdominal hernia      Anxiety      Chronic GERD 1/30/2020    Depression      Diabetes mellitus type 2 in obese 1/30/2020    Essential hypertension 1/30/2020    Hyperlipidemia      Morbid obesity with BMI of 50.0-59.9, adult (HCC) 1/30/2020    Obstructive sleep apnea 1/30/2020    Thyroid disease        HgbA1c - PENDING       Past Surgical History         Past Surgical History:   Procedure Laterality Date    SLEEVE GASTRECTOMY N/A 8/29/2022     LAPAROSCOPIC SLEEVE GASTRECTOMY-ETHICON/MEDTRONIC performed by Bonita Mendes MD at [facility] OR    UPPER GASTROINTESTINAL ENDOSCOPY N/A 2/11/2022     EGD BIOPSY performed by Bonita Mendes MD at [facility] ASC ENDOSCOPY    VENTRAL HERNIA REPAIR N/A 8/29/2022     LAPAROSCOPIC VENTRAL HERNIA  REPAIR performed by Bonita Mendes MD at [facility] OR         Social History               Socioeconomic History    Marital status:

## 2024-10-02 ENCOUNTER — OFFICE VISIT (OUTPATIENT)
Dept: SURGERY | Age: 44
End: 2024-10-02
Payer: COMMERCIAL

## 2024-10-02 VITALS
OXYGEN SATURATION: 98 % | BODY MASS INDEX: 36.57 KG/M2 | SYSTOLIC BLOOD PRESSURE: 119 MMHG | DIASTOLIC BLOOD PRESSURE: 77 MMHG | TEMPERATURE: 98.4 F | HEART RATE: 73 BPM | WEIGHT: 200 LBS

## 2024-10-02 DIAGNOSIS — M79.3 PANNICULITIS: Primary | ICD-10-CM

## 2024-10-02 PROCEDURE — 99214 OFFICE O/P EST MOD 30 MIN: CPT | Performed by: SURGERY

## 2024-10-02 PROCEDURE — 3074F SYST BP LT 130 MM HG: CPT | Performed by: SURGERY

## 2024-10-02 PROCEDURE — 3078F DIAST BP <80 MM HG: CPT | Performed by: SURGERY
